# Patient Record
Sex: FEMALE | Race: BLACK OR AFRICAN AMERICAN | NOT HISPANIC OR LATINO | Employment: OTHER | URBAN - METROPOLITAN AREA
[De-identification: names, ages, dates, MRNs, and addresses within clinical notes are randomized per-mention and may not be internally consistent; named-entity substitution may affect disease eponyms.]

---

## 2017-04-24 ENCOUNTER — HOSPITAL ENCOUNTER (EMERGENCY)
Facility: OTHER | Age: 52
Discharge: HOME | End: 2017-04-24
Attending: EMERGENCY MEDICINE
Payer: MEDICAID

## 2017-04-24 VITALS
HEART RATE: 90 BPM | SYSTOLIC BLOOD PRESSURE: 147 MMHG | DIASTOLIC BLOOD PRESSURE: 88 MMHG | BODY MASS INDEX: 46.13 KG/M2 | RESPIRATION RATE: 16 BRPM | HEIGHT: 60 IN | TEMPERATURE: 98 F | WEIGHT: 235 LBS | OXYGEN SATURATION: 98 %

## 2017-04-24 DIAGNOSIS — S86.912A MUSCLE STRAIN OF LEFT LOWER LEG, INITIAL ENCOUNTER: ICD-10-CM

## 2017-04-24 DIAGNOSIS — S46.912A MUSCLE STRAIN, UPPER ARM, LEFT, INITIAL ENCOUNTER: Primary | ICD-10-CM

## 2017-04-24 LAB
B-HCG UR QL: NEGATIVE
CTP QC/QA: YES

## 2017-04-24 PROCEDURE — 81025 URINE PREGNANCY TEST: CPT | Performed by: EMERGENCY MEDICINE

## 2017-04-24 PROCEDURE — 96372 THER/PROPH/DIAG INJ SC/IM: CPT

## 2017-04-24 PROCEDURE — 99283 EMERGENCY DEPT VISIT LOW MDM: CPT | Mod: 25

## 2017-04-24 PROCEDURE — 63600175 PHARM REV CODE 636 W HCPCS: Performed by: EMERGENCY MEDICINE

## 2017-04-24 RX ORDER — IBUPROFEN 800 MG/1
800 TABLET ORAL EVERY 6 HOURS PRN
Qty: 10 TABLET | Refills: 0 | Status: SHIPPED | OUTPATIENT
Start: 2017-04-24 | End: 2017-08-01

## 2017-04-24 RX ORDER — KETOROLAC TROMETHAMINE 30 MG/ML
30 INJECTION, SOLUTION INTRAMUSCULAR; INTRAVENOUS
Status: COMPLETED | OUTPATIENT
Start: 2017-04-24 | End: 2017-04-24

## 2017-04-24 RX ORDER — TRAMADOL HYDROCHLORIDE 50 MG/1
50 TABLET ORAL EVERY 6 HOURS PRN
Qty: 5 TABLET | Refills: 0 | Status: SHIPPED | OUTPATIENT
Start: 2017-04-24 | End: 2017-05-04

## 2017-04-24 RX ADMIN — KETOROLAC TROMETHAMINE 30 MG: 30 INJECTION, SOLUTION INTRAMUSCULAR at 02:04

## 2017-04-24 NOTE — ED NOTES
Pt reports left forearm pain x 3 days, intermittent, dull. Pt reports left calf pain that started on the way to ER. Denies injury. Reports her doctor said she has arthritis. NAD. Ambulatory from triage. No edema or redness present. Rates pain 8/10. Will cont to monitor.

## 2017-04-24 NOTE — ED PROVIDER NOTES
"Encounter Date: 2017    SCRIBE #1 NOTE: I, Maritza Rey, am scribing for, and in the presence of, Dr. Carrillo.       History     Chief Complaint   Patient presents with    Arm Pain     Patient c/o left sided arm pain starting at elbow going down to hand and left calf pain.  Patient stated that she took Motrin with little relief.  Patient denies trauma.  Patient stated, "I think its my arthritis."     Review of patient's allergies indicates:  No Known Allergies  HPI Comments:   Time seen by provider: 1:44 PM    The patient is a 51 y.o. female with HTN, CHF, and arthritis who presents to the ED with an onset of intermittent mild LUE pain that radiated from the elbow down to the hand and left calf pain. The calf pain is non-radiating. The symptoms began 3 days ago. She is right handed. The patient has taken Motrin with little to no relief in symptoms. She denies trauma or injury, SOB, chest pain, neck pain, any other symptoms at this time. No pertinent SHx noted.     The history is provided by the patient.     Past Medical History:   Diagnosis Date    CHF (congestive heart failure)     Hypertension      Past Surgical History:   Procedure Laterality Date     SECTION       History reviewed. No pertinent family history.  Social History   Substance Use Topics    Smoking status: Never Smoker    Smokeless tobacco: None    Alcohol use No     Review of Systems   Constitutional: Negative for chills and fever.   HENT: Negative for nosebleeds.    Eyes: Negative for visual disturbance.   Respiratory: Negative for shortness of breath.    Cardiovascular: Negative for chest pain.   Gastrointestinal: Negative for abdominal pain, diarrhea, nausea and vomiting.   Genitourinary: Negative for dysuria and hematuria.   Musculoskeletal: Positive for myalgias (left LUE and left calf). Negative for neck pain.   Skin: Negative for rash.   Neurological: Negative for seizures, syncope and headaches.     Physical Exam "   Initial Vitals   BP Pulse Resp Temp SpO2   04/24/17 1305 04/24/17 1305 04/24/17 1305 04/24/17 1305 04/24/17 1305   143/87 97 14 97.8 °F (36.6 °C) 97 %     Physical Exam    Nursing note and vitals reviewed.  Constitutional: She appears well-developed and well-nourished.   HENT:   Head: Normocephalic and atraumatic.   Mouth/Throat: Oropharynx is clear and moist.   Eyes: EOM are normal. Pupils are equal, round, and reactive to light.   Neck: Normal range of motion. Neck supple.   Cardiovascular: Normal rate, regular rhythm, S1 normal, S2 normal and normal heart sounds. Exam reveals no gallop and no friction rub.    No murmur heard.  Pulses:       Radial pulses are 2+ on the left side.        Dorsalis pedis pulses are 2+ on the left side.        Posterior tibial pulses are 2+ on the left side.   Capillary refill is less than 2 seconds.    Pulmonary/Chest: Breath sounds normal. No respiratory distress. She has no wheezes. She has no rhonchi. She has no rales.   Abdominal: Soft. Bowel sounds are normal. There is no tenderness. There is no rebound and no guarding.   Musculoskeletal: Normal range of motion. She exhibits no edema.        Left forearm: She exhibits tenderness.        Left lower leg: She exhibits no edema and no deformity.   Tenderness over the left forearm. No evidence of cellulitis or abscess formation. No LLE crepitus, step-off, or palpable cord.   Lymphadenopathy:     She has no cervical adenopathy.   Neurological: She is alert and oriented to person, place, and time.   LUE  strength 5/5 and sensation intact to light touch. LLE strength 5/5.    Skin: Skin is warm and dry. No lesion and no rash noted. No pallor.   Psychiatric: She has a normal mood and affect. Her behavior is normal. Judgment and thought content normal.       ED Course   Procedures  Labs Reviewed   POCT URINE PREGNANCY - Normal           Medical Decision Making:   History:   Old Medical Records: I decided to obtain old medical  records.  Clinical Tests:   Lab Tests: Reviewed and Ordered            Scribe Attestation:   Scribe #1: I performed the above scribed service and the documentation accurately describes the services I performed. I attest to the accuracy of the note.    Attending Attestation:           Physician Attestation for Scribe:  Physician Attestation Statement for Scribe #1: I, Dr. Carrillo, reviewed documentation, as scribed by Maritza Rey in my presence, and it is both accurate and complete.         Attending ED Notes:   Urgent evaluation a 51-year-old female with nontraumatic left forearm pain and left lower leg pain.  Patient is afebrile, nontoxic-appearing with stable vital signs.  Patient is neurovascularly intact without focal neurologic deficits.  No crepitus.  No step-offs.  No deformity.  No clinical evidence of infection, cellulitis or abscess formation.  No septic joint.  No palpable cord.  Negative Homans sign.  The patient is extensively counseled on her diagnosis and treatment, discharged in good condition and directed follow-up with her PCP in the next 24-48 hours.          ED Course     Clinical Impression:     1. Muscle strain, upper arm, left, initial encounter    2. Muscle strain of left lower leg, initial encounter          Disposition:   Disposition: Discharged  Condition: Stable         Good Carrillo MD  04/24/17 2052

## 2017-04-24 NOTE — ED AVS SNAPSHOT
[facility]  [address]  [facility] [address]               Vanessa Ho   2017  1:31 PM   ED    Description:  Female : 1965   Department:  [facility]           Your Care was Coordinated By:     Provider Role From To    Good Carrillo MD Attending Provider 17 1333 --      Reason for Visit     Arm Pain           Diagnoses this Visit        Comments    Muscle strain, upper arm, left, initial encounter    -  Primary     Muscle strain of left lower leg, initial encounter           ED Disposition     None           To Do List           Follow-up Information     Follow up with Phil Genao Jr, MD In 2 days.    Specialty:  Family Medicine    Contact information:    [address]  [facility] [id number] [phone]         These Medications        Disp Refills Start End    ibuprofen (ADVIL,MOTRIN) 800 MG tablet 10 tablet 0 2017     Take 1 tablet (800 mg total) by mouth every 6 (six) hours as needed for Pain. - Oral    tramadol (ULTRAM) 50 mg tablet 5 tablet 0 2017    Take 1 tablet (50 mg total) by mouth every 6 (six) hours as needed for Pain. - Oral      [facility]     [facility] Nurse Care Line -  Assistance  Unless otherwise directed by your provider, please contact [facility], our nurse care line that is available for  assistance.     Registered nurses in the [facility] provide: appointment scheduling, clinical advisement, health education, and other advisory services.  Call: [phone] (toll free)               Medications           Message regarding Medications     Verify the changes and/or additions to your medication regime listed below are the same as discussed with your clinician today.  If any of these changes or additions are incorrect, please notify your healthcare provider.        START taking these NEW medications        Refills    ibuprofen  (ADVIL,MOTRIN) 800 MG tablet 0    Sig: Take 1 tablet (800 mg total) by mouth every 6 (six) hours as needed for Pain.    Class: Print    Route: Oral    tramadol (ULTRAM) 50 mg tablet 0    Sig: Take 1 tablet (50 mg total) by mouth every 6 (six) hours as needed for Pain.    Class: Print    Route: Oral      These medications were administered today        Dose Freq    ketorolac injection 30 mg 30 mg ED 1 Time    Sig: Inject 30 mg into the muscle ED 1 Time.    Class: Normal    Route: Intramuscular           Verify that the below list of medications is an accurate representation of the medications you are currently taking.  If none reported, the list may be blank. If incorrect, please contact your healthcare provider. Carry this list with you in case of emergency.           Current Medications     losartan-hydrochlorothiazide 50-12.5 mg (HYZAAR) 50-12.5 mg per tablet Take 1 tablet by mouth once daily.    ibuprofen (ADVIL,MOTRIN) 800 MG tablet Take 1 tablet (800 mg total) by mouth every 6 (six) hours as needed for Pain.    ketorolac injection 30 mg Inject 30 mg into the muscle ED 1 Time.    tramadol (ULTRAM) 50 mg tablet Take 1 tablet (50 mg total) by mouth every 6 (six) hours as needed for Pain.           Clinical Reference Information           Your Vitals Were     BP Pulse Temp Resp Height Weight    143/87 (BP Location: Left arm, Patient Position: Sitting) 97 97.8 °F (36.6 °C) (Oral) 14 5' (1.524 m) 106.6 kg (235 lb)    Last Period SpO2 BMI          04/04/2017 97% 45.9 kg/m2        Allergies as of 4/24/2017     No Known Allergies      Immunizations Administered on Date of Encounter - 4/24/2017     None      ED Micro, Lab, POCT     Start Ordered       Status Ordering Provider    04/24/17 1308 04/24/17 1307  POCT urine pregnancy  Once      Final result       ED Imaging Orders     None      Discharge References/Attachments     MUSCLE STRAIN, EXTREMITY (ENGLISH)      [facility] Sign-Up     Activating your [facility] account  is as easy as 1-2-3!     1) Visit [email or web address], select Sign Up Now, enter this activation code and your date of birth, then select Next.  [email or web address]  Expires: 6/8/2017  2:04 PM      2) Create a username and password to use when you visit [facility] in the future and select a security question in case you lose your password and select Next.    3) Enter your e-mail address and click Sign Up!    Additional Information  If you have questions, please e-mail [email or web address] or call [phone] to talk to our [employer] staff. Remember, [facility] is NOT to be used for urgent needs. For medical emergencies, dial 911.          [facility] complies with applicable Federal civil rights laws and does not discriminate on the basis of race, color, national origin, age, disability, or sex.        Language Assistance Services     ATTENTION: Language assistance services are available, free of charge. Please call [phone].      ATENCIÓN: Si habla español, tiene a xavier disposición servicios gratuitos de asistencia lingüística. Llame al [phone].     CHÚ Ý: N?u b?n nói Ti?ng Vi?t, có các d?ch v? h? tr? ngôn ng? mi?n phí dành cho b?n. G?i s? [phone].

## 2017-08-01 ENCOUNTER — HOSPITAL ENCOUNTER (EMERGENCY)
Facility: OTHER | Age: 52
Discharge: HOME | End: 2017-08-01
Attending: EMERGENCY MEDICINE
Payer: MEDICAID

## 2017-08-01 VITALS
OXYGEN SATURATION: 100 % | HEIGHT: 60 IN | BODY MASS INDEX: 45.55 KG/M2 | HEART RATE: 79 BPM | TEMPERATURE: 99 F | DIASTOLIC BLOOD PRESSURE: 80 MMHG | RESPIRATION RATE: 18 BRPM | WEIGHT: 232 LBS | SYSTOLIC BLOOD PRESSURE: 168 MMHG

## 2017-08-01 DIAGNOSIS — G56.02 CARPAL TUNNEL SYNDROME OF LEFT WRIST: Primary | ICD-10-CM

## 2017-08-01 LAB
B-HCG UR QL: NEGATIVE
CTP QC/QA: YES

## 2017-08-01 PROCEDURE — 99283 EMERGENCY DEPT VISIT LOW MDM: CPT | Mod: 25

## 2017-08-01 PROCEDURE — 63600175 PHARM REV CODE 636 W HCPCS: Performed by: EMERGENCY MEDICINE

## 2017-08-01 PROCEDURE — 81025 URINE PREGNANCY TEST: CPT | Performed by: EMERGENCY MEDICINE

## 2017-08-01 PROCEDURE — 29125 APPL SHORT ARM SPLINT STATIC: CPT | Mod: LT

## 2017-08-01 RX ORDER — IBUPROFEN 800 MG/1
800 TABLET ORAL EVERY 6 HOURS PRN
Qty: 12 TABLET | Refills: 0 | Status: SHIPPED | OUTPATIENT
Start: 2017-08-01 | End: 2017-12-10 | Stop reason: ALTCHOICE

## 2017-08-01 RX ORDER — DEXAMETHASONE SODIUM PHOSPHATE 4 MG/ML
8 INJECTION, SOLUTION INTRA-ARTICULAR; INTRALESIONAL; INTRAMUSCULAR; INTRAVENOUS; SOFT TISSUE
Status: COMPLETED | OUTPATIENT
Start: 2017-08-01 | End: 2017-08-01

## 2017-08-01 RX ORDER — KETOROLAC TROMETHAMINE 30 MG/ML
30 INJECTION, SOLUTION INTRAMUSCULAR; INTRAVENOUS
Status: COMPLETED | OUTPATIENT
Start: 2017-08-01 | End: 2017-08-01

## 2017-08-01 RX ADMIN — KETOROLAC TROMETHAMINE 30 MG: 30 INJECTION, SOLUTION INTRAMUSCULAR at 03:08

## 2017-08-01 RX ADMIN — DEXAMETHASONE SODIUM PHOSPHATE 8 MG: 4 INJECTION, SOLUTION INTRAMUSCULAR; INTRAVENOUS at 03:08

## 2017-08-01 NOTE — ED PROVIDER NOTES
"Encounter Date: 2017    SCRIBE #1 NOTE: I, Ninfa Maikol, am scribing for, and in the presence of, Dr. Jenkins.       History     Chief Complaint   Patient presents with    Wrist Pain     "I got that carpul tunnels"     Time seen by provider: 2:46 PM    This is a 51 y.o. female, with history of CHF and HTN, who presents with complaint of pain in her left hand that began two days ago. The pain is described as intermittent, aching, and rates 8/10 at its worst. The patient reports "tingling" in her left hand, but denies any fever, chills, nausea, vomiting, weakness, back pain, or headache. Pt denies any recent trauma, and reports that the pain worsens with exposure to the cold. She experienced some relief after applying IcyHot. Her LMP occurred last month. She denies use of tobacco, alcohol, or illicit drugs.      The history is provided by the patient.     Review of patient's allergies indicates:  No Known Allergies  Past Medical History:   Diagnosis Date    CHF (congestive heart failure)     Hypertension      Past Surgical History:   Procedure Laterality Date     SECTION       History reviewed. No pertinent family history.  Social History   Substance Use Topics    Smoking status: Never Smoker    Smokeless tobacco: Never Used    Alcohol use No     Review of Systems   Constitutional: Negative for chills and fever.   HENT: Negative for congestion and sore throat.    Eyes: Negative for photophobia and redness.   Respiratory: Negative for cough and shortness of breath.    Cardiovascular: Negative for chest pain.   Gastrointestinal: Negative for abdominal pain, nausea and vomiting.   Genitourinary: Negative for dysuria.   Musculoskeletal: Negative for back pain.        Positive for pain to the left hand.   Skin: Negative for rash.   Neurological: Negative for weakness, light-headedness and headaches.        Positive for "tingling" sensation that affects left hand.   Psychiatric/Behavioral: Negative for " confusion.       Physical Exam     Initial Vitals [08/01/17 1301]   BP Pulse Resp Temp SpO2   (!) 178/79 84 16 98.4 °F (36.9 °C) 99 %      MAP       112         Physical Exam    Nursing note and vitals reviewed.  Constitutional: She appears well-developed and well-nourished. She is not diaphoretic. No distress.   HENT:   Head: Normocephalic and atraumatic.   Mouth/Throat: Oropharynx is clear and moist.   Eyes: Conjunctivae and EOM are normal. Pupils are equal, round, and reactive to light. No scleral icterus.   Neck: Normal range of motion. Neck supple.   Cardiovascular: Normal rate, regular rhythm, S1 normal, S2 normal and normal heart sounds. Exam reveals no gallop and no friction rub.    No murmur heard.  Pulses:       Radial pulses are 2+ on the left side.   Pulmonary/Chest: Breath sounds normal. No respiratory distress. She has no wheezes. She has no rhonchi. She has no rales.   Abdominal: Soft. Bowel sounds are normal. There is no tenderness. There is no rebound and no guarding.   Musculoskeletal: Normal range of motion. She exhibits no edema or tenderness.   No tenderness over the scaphoid. Negative Phalen's and Tinel's sign. No crepitus, step-offs, or deformities.   Lymphadenopathy:     She has no cervical adenopathy.   Neurological: She is alert and oriented to person, place, and time.   LUE: Strength is 5/5. Sensation is intact.    Skin: Skin is warm and dry. Capillary refill takes less than 2 seconds. No rash noted. No pallor.   No clinical evidence of infection, cellulitis, or abscess formation. No clinical evidence of septic joint. Capillary refill <2 seconds.     Psychiatric: She has a normal mood and affect. Her behavior is normal. Judgment and thought content normal.         ED Course   Orthopedic Injury  Date/Time: 8/1/2017 3:19 PM  Authorized by: JF MCMILLAN   Performed by: JF MCMILLAN  Consent Done: Not Needed  Injury location: hand  Location details: left hand  Pre-procedure  neurological function: normal  Pre-procedure range of motion: normal    Sedation:  Patient sedated: no  Immobilization: splint  Splint type: velcro.  Post-procedure neurological function: normal  Post-procedure range of motion: normal  Patient tolerance: Patient tolerated the procedure well with no immediate complications        Labs Reviewed   POCT URINE PREGNANCY             Medical Decision Making:   Clinical Tests:   Lab Tests: Ordered and Reviewed            Scribe Attestation:   Scribe #1: I performed the above scribed service and the documentation accurately describes the services I performed. I attest to the accuracy of the note.    Attending Attestation:           Physician Attestation for Scribe:  Physician Attestation Statement for Scribe #1: I, Dr. Carrillo, reviewed documentation, as scribed by Ninfa Lassiter in my presence, and it is both accurate and complete.         Attending ED Notes:   Urgent evaluation a 51-year-old female with nontraumatic intermittent left hand pain.  Patient is afebrile, nontoxic-appearing with stable vital signs except for elevation in blood pressure.  Patient is neurovascularly intact without focal neurologic deficits.  No clinical evidence of infection, cellulitis or abscess formation.  No deformity, crepitus or step-offs.  Given patient's history present illness and clinical presentation including physical exam I do suspect patient is having symptoms of early carpal tunnel syndrome.  The patient is extensively counseled on her diagnosis and treatment, discharged in good condition and directed follow-up with orthopedics in the next 24-48 hours.          ED Course     Clinical Impression:     1. Carpal tunnel syndrome of left wrist                                 Good Carrillo MD  08/01/17 3716

## 2017-08-18 ENCOUNTER — HOSPITAL ENCOUNTER (EMERGENCY)
Facility: OTHER | Age: 52
Discharge: HOME | End: 2017-08-18
Attending: EMERGENCY MEDICINE
Payer: MEDICAID

## 2017-08-18 VITALS
BODY MASS INDEX: 46.13 KG/M2 | WEIGHT: 235 LBS | DIASTOLIC BLOOD PRESSURE: 71 MMHG | TEMPERATURE: 98 F | OXYGEN SATURATION: 99 % | HEIGHT: 60 IN | RESPIRATION RATE: 16 BRPM | HEART RATE: 80 BPM | SYSTOLIC BLOOD PRESSURE: 125 MMHG

## 2017-08-18 DIAGNOSIS — M79.642 PAIN OF LEFT HAND: Primary | ICD-10-CM

## 2017-08-18 PROCEDURE — 99282 EMERGENCY DEPT VISIT SF MDM: CPT

## 2017-08-18 NOTE — ED NOTES
Pt c/o intermittent left pain that worsens when it gets cold. She states the brace helped her hand but she left it on the bus. Denies trauma

## 2017-08-18 NOTE — ED PROVIDER NOTES
Encounter Date: 2017       History     Chief Complaint   Patient presents with    Hand Pain     L hand pain. reports had  a brace and left it on the bus. reports brace was helping the pain.      Patient is a 51 y.o. female with a past medical history of hypertension, CHF, presenting to the emergency department with complaints of persistent left hand pain.  The patient reports her symptoms initially started a few weeks ago.  She admits she was seen in this emergency department over 2 weeks ago where she was diagnosed with carpal tunnel syndrome.  She states she was given a wrist splint.  She states that the splint has significantly improved her symptoms, especially when used in combination with Motrin.  She reports she left her splint on the bus yesterday and her symptoms worsened overnight.  She reports tingling and pain to her left hand most significant at the first 3 digits, however does include all of them.  She denies recent injury or trauma.  She states that her PCP put in a referral for her to be seen at [facility] and she is still waiting.  She reports she last took motrin this am. She has no other complaints at this time.         The history is provided by the patient.     Review of patient's allergies indicates:  No Known Allergies  Past Medical History:   Diagnosis Date    CHF (congestive heart failure)     Hypertension      Past Surgical History:   Procedure Laterality Date     SECTION       History reviewed. No pertinent family history.  Social History   Substance Use Topics    Smoking status: Never Smoker    Smokeless tobacco: Never Used    Alcohol use No     Review of Systems   Constitutional: Negative for activity change, appetite change, chills, fatigue and fever.   HENT: Negative for congestion, rhinorrhea and sore throat.    Eyes: Negative for photophobia and visual disturbance.   Respiratory: Negative for cough, shortness of breath and wheezing.    Cardiovascular: Negative for chest  pain.   Gastrointestinal: Negative for abdominal pain, diarrhea, nausea and vomiting.   Genitourinary: Negative for dysuria, hematuria and urgency.   Musculoskeletal: Positive for arthralgias and myalgias. Negative for back pain and neck pain.   Skin: Negative for color change and wound.   Neurological: Negative for weakness and headaches.   Psychiatric/Behavioral: Negative for agitation and confusion.       Physical Exam     Initial Vitals [08/18/17 1008]   BP Pulse Resp Temp SpO2   123/77 106 18 98.1 °F (36.7 °C) 97 %      MAP       92.33         Physical Exam    Nursing note and vitals reviewed.  Constitutional: Vital signs are normal. She appears well-developed and well-nourished. She is not diaphoretic. She is Obese . She is cooperative.  Non-toxic appearance. She does not have a sickly appearance. She does not appear ill. No distress.   Well appearing, obese, -American female unaccompanied in the emergency department.  She is speaking in clear and full sentences.  She is in no acute distress.   HENT:   Head: Normocephalic and atraumatic.   Right Ear: External ear normal.   Left Ear: External ear normal.   Nose: Nose normal.   Mouth/Throat: Oropharynx is clear and moist.   Eyes: Conjunctivae and EOM are normal.   Neck: Normal range of motion. Neck supple.   Cardiovascular: Normal rate, regular rhythm and normal heart sounds.   Pulmonary/Chest: Breath sounds normal. No respiratory distress. She has no wheezes.   Musculoskeletal: Normal range of motion.   No significant tenderness to palpation of the left upper extremity near the wrist, hand or fingers.  Normal range of motion, strength, sensation diffusely.  Good capillary refill.  No overlying skin changes. Negative phalen or tinel test.    Neurological: She is alert and oriented to person, place, and time. She has normal strength. No sensory deficit. GCS eye subscore is 4. GCS verbal subscore is 5. GCS motor subscore is 6.   Skin: Skin is warm.    Psychiatric: She has a normal mood and affect. Her behavior is normal. Judgment and thought content normal.         ED Course   Procedures  Labs Reviewed - No data to display          Medical Decision Making:   Initial Assessment:   Urgent evaluation of a 51 y.o. female with a past medical history of HTN and CHF, presenting to the emergency department complaining of left hand pain x3 weeks. Patient is afebrile, nontoxic appearing, hemodynamically stable and neurovascularly intact.  Physical exam reveals regular rate and rhythm, lungs are clear to auscultation bilaterally.  Normal range of motion, strength and sensation the bilateral upper extremities.  No significant abnormalities of the left wrist, hand, fingers.  ED Management:  Given the patient's history and physical exam findings, I do not feel that further testing or imaging is warranted.  Patient has no history of trauma to warrant x-ray.  Signs and symptoms are likely secondary to musculoskeletal etiology, possibly carpal tunnel.  I will provide the patient with a new brace.  She was discharged home in stable condition, counseled on symptomatic care and treatment.  I did recommend that she continue to obtain follow-up with [facility].  Stable for discharge. The patient was instructed to follow up with a primary care provider in 2 days or to return to the emergency department for worsening symptoms. The treatment plan was discussed with the patient who demonstrated understanding and comfort with plan. The patient's history, physical exam, and plan of care was discussed with and agreed upon with my supervising physician.    Other:   I have discussed this case with another health care provider.       <> Summary of the Discussion: Dr. Jenkins  This note was created using Dragon Medical Dictation. There may be typographical errors secondary to dictation.                    ED Course     Clinical Impression:     1. Pain of left hand         Disposition:    Disposition: Discharged  Condition: Stable                        Kaylee Robertson PA-C  08/18/17 2714

## 2017-12-10 ENCOUNTER — HOSPITAL ENCOUNTER (EMERGENCY)
Facility: OTHER | Age: 52
Discharge: HOME | End: 2017-12-10
Attending: EMERGENCY MEDICINE
Payer: MEDICAID

## 2017-12-10 VITALS
RESPIRATION RATE: 18 BRPM | HEIGHT: 60 IN | BODY MASS INDEX: 48.49 KG/M2 | SYSTOLIC BLOOD PRESSURE: 131 MMHG | OXYGEN SATURATION: 99 % | TEMPERATURE: 99 F | DIASTOLIC BLOOD PRESSURE: 94 MMHG | WEIGHT: 247 LBS | HEART RATE: 76 BPM

## 2017-12-10 DIAGNOSIS — M79.642 PAIN OF LEFT HAND: Primary | ICD-10-CM

## 2017-12-10 PROCEDURE — 99283 EMERGENCY DEPT VISIT LOW MDM: CPT | Mod: 25

## 2017-12-10 PROCEDURE — 96372 THER/PROPH/DIAG INJ SC/IM: CPT

## 2017-12-10 PROCEDURE — 63600175 PHARM REV CODE 636 W HCPCS: Performed by: PHYSICIAN ASSISTANT

## 2017-12-10 PROCEDURE — 29125 APPL SHORT ARM SPLINT STATIC: CPT | Mod: LT

## 2017-12-10 RX ORDER — IBUPROFEN 600 MG/1
600 TABLET ORAL EVERY 6 HOURS PRN
Qty: 20 TABLET | Refills: 0 | Status: SHIPPED | OUTPATIENT
Start: 2017-12-10 | End: 2018-07-21

## 2017-12-10 RX ORDER — KETOROLAC TROMETHAMINE 30 MG/ML
15 INJECTION, SOLUTION INTRAMUSCULAR; INTRAVENOUS
Status: COMPLETED | OUTPATIENT
Start: 2017-12-10 | End: 2017-12-10

## 2017-12-10 RX ORDER — FUROSEMIDE 40 MG/1
40 TABLET ORAL DAILY PRN
COMMUNITY
End: 2018-02-09

## 2017-12-10 RX ADMIN — KETOROLAC TROMETHAMINE 15 MG: 30 INJECTION, SOLUTION INTRAMUSCULAR at 10:12

## 2017-12-10 NOTE — ED NOTES
"Pt c/o left thumb pain at the MCP joint X 2 days, not relieved with 800 mg of motrin. Pt c/o pain is exacerbated with movement. Pt states "one time my finger locked up."  "

## 2017-12-10 NOTE — ED PROVIDER NOTES
"Encounter Date: 12/10/2017       History     Chief Complaint   Patient presents with    Hand Pain     + left sided hand pain x two days " I have that carpet tunnel pains with this cold weather. I am also out of my pressure pills".      Pt is a 52-year-old female with history of hypertension and CHF who presents to the emergency department with left hand pain.  She states over the last several years she has had intermittent pain to the left wrist.  She states she has been diagnosed with carpal tunnel syndrome.  She states her symptoms act up during cold weather.  She denies any numbness or tingling.  She describes the pain as a throbbing sensation.  She denies new injury.  She denies swelling, redness, or warmth of the wrist.  She denies rashes or wounds.        The history is provided by the patient.     Review of patient's allergies indicates:  No Known Allergies  Past Medical History:   Diagnosis Date    CHF (congestive heart failure)     Hypertension      Past Surgical History:   Procedure Laterality Date     SECTION       No family history on file.  Social History   Substance Use Topics    Smoking status: Never Smoker    Smokeless tobacco: Never Used    Alcohol use No     Review of Systems   Constitutional: Negative for activity change, appetite change, chills and fever.   HENT: Negative for congestion, ear discharge, ear pain, nosebleeds, postnasal drip, rhinorrhea, sore throat and trouble swallowing.    Respiratory: Negative for cough and shortness of breath.    Cardiovascular: Negative for chest pain.   Gastrointestinal: Negative for abdominal pain, blood in stool, constipation, diarrhea, nausea and vomiting.   Genitourinary: Negative for dysuria, flank pain and hematuria.   Musculoskeletal: Negative for back pain, neck pain and neck stiffness.        Left wrist and thumb pain   Skin: Negative for rash and wound.   Neurological: Negative for dizziness, speech difficulty, weakness, " light-headedness, numbness and headaches.       Physical Exam     Initial Vitals [12/10/17 0959]   BP Pulse Resp Temp SpO2   (!) 140/87 99 16 97.8 °F (36.6 °C) 98 %      MAP       104.67         Physical Exam    Nursing note and vitals reviewed.  Constitutional: She appears well-developed and well-nourished. She is not diaphoretic.  Non-toxic appearance. No distress.   HENT:   Head: Normocephalic.   Right Ear: Hearing and external ear normal.   Left Ear: Hearing and external ear normal.   Nose: Nose normal.   Mouth/Throat: Uvula is midline, oropharynx is clear and moist and mucous membranes are normal. No trismus in the jaw. No uvula swelling. No oropharyngeal exudate.   Eyes: Conjunctivae are normal. Pupils are equal, round, and reactive to light.   Neck: Normal range of motion.   Cardiovascular: Normal rate and regular rhythm.   Pulmonary/Chest: Breath sounds normal. No respiratory distress. She has no wheezes. She has no rhonchi. She has no rales. She exhibits no tenderness.   Abdominal: Soft. Bowel sounds are normal. There is no tenderness.   Musculoskeletal:        Left wrist: She exhibits tenderness (with range of motion).   Negative tinnel and phalens; NV intact;  Pain at the radial styloid with active an passive stretch the thumb tendons over the radial styloid in thumb flexion   Lymphadenopathy:     She has no cervical adenopathy.   Neurological: She is alert and oriented to person, place, and time.   Skin: Skin is warm and dry. Capillary refill takes less than 2 seconds.   Psychiatric: She has a normal mood and affect.         ED Course   Procedures  Labs Reviewed - No data to display          Medical Decision Making:   Initial Assessment:   Urgent evaluation of a 52-year-old female with history of hypertension and CHF who presents to the emergency department with left wrist pain.  Patient is afebrile, nontoxic appearing, and hemodynamically stable.  No new injury.  On exam, there is no swelling, erythema,  or warmth of the left wrist.  No scaphoid tenderness.  Negative Tinel and Phalen's.  Positive Finkelstein maneuver.  I suspect tendinitis.  She'll be given anti-inflammatories.  She is requesting a cock-up splint.  She is advised to follow-up with or so.  She is advised to return to the emergency department with any worsening symptoms or concerns.    Other:   I have discussed this case with another health care provider.       <> Summary of the Discussion: Dr. Carrillo                   ED Course      Clinical Impression:   The encounter diagnosis was Pain of left hand.                           Ines Shaikh PA-C  12/10/17 1112

## 2017-12-10 NOTE — ED NOTES
A left wrist velcro splint was applied to the patient, she tolerated application well, CMS intact. Ines PISANO was notified and she said that she would assess the splint.

## 2017-12-10 NOTE — ED NOTES
Patient Identifiers for Vanessa Ho checked and correct  LOC: The patient is awake, alert and aware of environment with an appropriate affect, the patient is oriented x 3 and speaking appropriate.  APPEARANCE: Patient resting comfortably and in no acute distress. The patient is clean and well groomed. The patient's clothing is properly fastened.  SKIN: The skin is warm and dry. The patient has normal skin turgor and moist mucus membranes. No rashes or lesions upon observation. Skin Intact , no breakdown noted.  Musculoskeletal :  Normal range of motion noted. Moves all extremities well, No swelling. Palpation tenderness of the left thumb at the MCP joint.  RESPIRATORY: Airway is open and patent, respirations are spontaneous, patient has a normal effort and rate.   PULSES: 2+ radial  pulses, symmetrical .    Will continue to monitor

## 2017-12-23 ENCOUNTER — HOSPITAL ENCOUNTER (EMERGENCY)
Facility: OTHER | Age: 52
Discharge: HOME | End: 2017-12-23
Attending: EMERGENCY MEDICINE
Payer: MEDICAID

## 2017-12-23 VITALS
HEART RATE: 74 BPM | OXYGEN SATURATION: 100 % | BODY MASS INDEX: 47.12 KG/M2 | RESPIRATION RATE: 16 BRPM | TEMPERATURE: 98 F | SYSTOLIC BLOOD PRESSURE: 129 MMHG | HEIGHT: 60 IN | DIASTOLIC BLOOD PRESSURE: 79 MMHG | WEIGHT: 240 LBS

## 2017-12-23 DIAGNOSIS — B37.2 CANDIDAL INTERTRIGO: Primary | ICD-10-CM

## 2017-12-23 PROCEDURE — 99283 EMERGENCY DEPT VISIT LOW MDM: CPT

## 2017-12-23 RX ORDER — CLOTRIMAZOLE 1 %
CREAM (GRAM) TOPICAL
Qty: 15 G | Refills: 0 | Status: SHIPPED | OUTPATIENT
Start: 2017-12-23 | End: 2018-07-21

## 2017-12-23 NOTE — ED TRIAGE NOTES
"Pt reports to ED c/o rash below left breast with itching and pain x 2 days. Pt admits history of rash and PCP prescribed pt cream "for some fungus". Dark patches noted. Pt admits area intermittently moist.   "

## 2017-12-23 NOTE — ED PROVIDER NOTES
"Encounter Date: 2017    SCRIBE #1 NOTE: I, Rah Conrad, am scribing for, and in the presence of, Dr. Lobo.       History     Chief Complaint   Patient presents with    Rash     "I have a rash underneath my (left) breast because I've been sweating a lot." Denies fevers, chills, SOB, CP, N/V/D     9:15 AM    Patient is a 52 y.o. female with a history of HTN who presents to the ED with complaint of rash which she first noticed yesterday. Rash is located underneath the left breast, and is itchy, but not painful. She denies chest pain or shortness of breath. She reports getting new bras 2-3 weeks ago. She also states "I sweat a lot under my breasts." She reports a similar rash in the past that she states was fungal, which was relieved with "yellow and white cream" that was prescribed. She reports having recent PCP visit with blood work, which she states was normal. She has no additional complaints.      The history is provided by the patient.     Review of patient's allergies indicates:  No Known Allergies  Past Medical History:   Diagnosis Date    CHF (congestive heart failure)     Hypertension      Past Surgical History:   Procedure Laterality Date     SECTION       History reviewed. No pertinent family history.  Social History   Substance Use Topics    Smoking status: Never Smoker    Smokeless tobacco: Never Used    Alcohol use No     Review of Systems   Constitutional: Negative for fever.   HENT: Negative for sore throat.    Respiratory: Negative for shortness of breath.    Cardiovascular: Negative for chest pain.   Gastrointestinal: Negative for nausea.   Genitourinary: Negative for dysuria.   Musculoskeletal: Negative for back pain.   Skin: Positive for rash (left breast).   Neurological: Negative for weakness.   Hematological: Does not bruise/bleed easily.       Physical Exam     Initial Vitals [17 0750]   BP Pulse Resp Temp SpO2   (!) 159/96 85 16 98.1 °F (36.7 °C) 98 %    "   MAP       117         Physical Exam    Nursing note and vitals reviewed.  Constitutional: She appears well-developed and well-nourished. No distress.   HENT:   Head: Normocephalic and atraumatic.   Eyes: Conjunctivae and EOM are normal.   Neck: Neck supple.   Cardiovascular: Intact distal pulses.   Pulmonary/Chest: No respiratory distress.   Musculoskeletal: Normal range of motion. She exhibits no tenderness.   Neurological: She is alert and oriented to person, place, and time.   Skin: Skin is warm and dry. Rash noted.   Small area of hyperpigmentation underneath the left breast. No erythema or tenderness.   Psychiatric: She has a normal mood and affect. Her behavior is normal. Judgment and thought content normal.         ED Course   Procedures  Labs Reviewed - No data to display          Medical Decision Making:   ED Management:      52F history of HTN with rash under left breast for two days. Exam is consistent with candidal intertrigo. No sign of cellulitis or contact dermatitis, no systemic complaints or other concerns. She had recent PCP check up with no sign of diabetes, and does not want finger stick now to confirm. Likely from sweat and obesity. Will prescribe topical lotrimin with return precautions for any worsening.              Scribe Attestation:   Scribe #1: I performed the above scribed service and the documentation accurately describes the services I performed. I attest to the accuracy of the note.    Attending Attestation:           Physician Attestation for Scribe:  Physician Attestation Statement for Scribe #1: I, Rah Conrad, reviewed documentation, as scribed by Dr. Lobo in my presence, and it is both accurate and complete.                 ED Course      Clinical Impression:     1. Candidal intertrigo                               Dar Lobo MD  12/23/17 2639

## 2018-02-09 ENCOUNTER — HOSPITAL ENCOUNTER (EMERGENCY)
Facility: OTHER | Age: 53
Discharge: HOME | End: 2018-02-09
Attending: EMERGENCY MEDICINE
Payer: MEDICAID

## 2018-02-09 VITALS
DIASTOLIC BLOOD PRESSURE: 73 MMHG | HEART RATE: 93 BPM | WEIGHT: 250 LBS | OXYGEN SATURATION: 97 % | BODY MASS INDEX: 49.08 KG/M2 | RESPIRATION RATE: 18 BRPM | TEMPERATURE: 98 F | HEIGHT: 60 IN | SYSTOLIC BLOOD PRESSURE: 124 MMHG

## 2018-02-09 DIAGNOSIS — M25.9 KNEE PROBLEM: ICD-10-CM

## 2018-02-09 DIAGNOSIS — J06.9 VIRAL URI WITH COUGH: Primary | ICD-10-CM

## 2018-02-09 PROCEDURE — 25000003 PHARM REV CODE 250: Performed by: PHYSICIAN ASSISTANT

## 2018-02-09 PROCEDURE — 99283 EMERGENCY DEPT VISIT LOW MDM: CPT

## 2018-02-09 RX ORDER — CETIRIZINE HYDROCHLORIDE 10 MG/1
10 TABLET ORAL DAILY
Qty: 30 TABLET | Refills: 0 | Status: SHIPPED | OUTPATIENT
Start: 2018-02-09 | End: 2018-07-21

## 2018-02-09 RX ORDER — GUAIFENESIN/DEXTROMETHORPHAN 100-10MG/5
5 SYRUP ORAL ONCE
Status: COMPLETED | OUTPATIENT
Start: 2018-02-09 | End: 2018-02-09

## 2018-02-09 RX ORDER — GUAIFENESIN/DEXTROMETHORPHAN 100-10MG/5
5 SYRUP ORAL 4 TIMES DAILY PRN
Qty: 120 ML | Refills: 0 | Status: SHIPPED | OUTPATIENT
Start: 2018-02-09 | End: 2018-02-19

## 2018-02-09 RX ORDER — FLUTICASONE PROPIONATE 50 MCG
1 SPRAY, SUSPENSION (ML) NASAL 2 TIMES DAILY PRN
Qty: 15 G | Refills: 0 | Status: SHIPPED | OUTPATIENT
Start: 2018-02-09 | End: 2018-07-21

## 2018-02-09 RX ADMIN — GUAIFENESIN AND DEXTROMETHORPHAN 5 ML: 100; 10 SYRUP ORAL at 11:02

## 2018-02-09 NOTE — ED PROVIDER NOTES
"Encounter Date: 2018       History     Chief Complaint   Patient presents with    Cough     pt states "I've got a cough, it just started yesterday when I was in the cold air."  Also states "my right knee feels like it's going to give out."       Patient is 52-year-old female no past medical history presents with complaints of cough with nasal congestion that started yesterday.  She reports persistent despite taking doses of TheraFlu last night.  She reports no body aches, sore throat, fever, chills, nausea, vomiting, bowel changes.  She has not taken any medications today prior to arrival.  She also endorses that her right knee sometimes feels like it may give out.  She denies any pain, swelling, redness, overlying wounds.  She reports no difficulty ambulate.  No trauma or injury.  Has not taken any medications to help with knee pain though reports that knee pain also started yesterday.  Currently unaccompanied in the ER.          Review of patient's allergies indicates:  No Known Allergies  Past Medical History:   Diagnosis Date    Hypertension      Past Surgical History:   Procedure Laterality Date     SECTION       History reviewed. No pertinent family history.  Social History   Substance Use Topics    Smoking status: Never Smoker    Smokeless tobacco: Never Used    Alcohol use No     Review of Systems   Constitutional: Negative for fever.   HENT: Negative for sore throat.    Respiratory: Positive for cough. Negative for shortness of breath.    Cardiovascular: Negative for chest pain.   Gastrointestinal: Negative for nausea.   Genitourinary: Negative for dysuria.   Musculoskeletal: Negative for back pain.        Right knee pain   Skin: Negative for rash.   Neurological: Negative for weakness.   Hematological: Does not bruise/bleed easily.       Physical Exam     Initial Vitals [18 1031]   BP Pulse Resp Temp SpO2   (!) 150/93 98 18 98.6 °F (37 °C) 95 %      MAP       112         Physical " Exam    Nursing note and vitals reviewed.  Constitutional: She appears well-developed and well-nourished. She is not diaphoretic. No distress.   Healthy appearing female in no acute distress or apparent pain.  Makes good eye contact, speaks in clear full sentences and ambulates with ease.  Occasional cough during interview and exam.   HENT:   Head: Normocephalic and atraumatic.   Posterior oropharynx is slightly erythematous with some cobblestoning.  There is no trismus, uvula deviation or lingual elevation.  TMs clear bilaterally.  Nasal turbinates erythematous with clear mucus.   Eyes: Conjunctivae and EOM are normal. Pupils are equal, round, and reactive to light. Right eye exhibits no discharge. Left eye exhibits no discharge. No scleral icterus.   Neck: Normal range of motion.   No meningismus   Cardiovascular: Normal rate, regular rhythm, normal heart sounds and intact distal pulses. Exam reveals no gallop and no friction rub.    No murmur heard.  Pulmonary/Chest: Breath sounds normal. She has no wheezes. She has no rhonchi. She has no rales.   Lungs clear to auscultation bilaterally.    Abdominal: Soft. Bowel sounds are normal. There is no tenderness. There is no rebound and no guarding.   Musculoskeletal: Normal range of motion. She exhibits no edema or tenderness.   Right knee has no reproducible tenderness to palpation overlying erythema, edema, skin breakdown.  Distally she has normal range of motion of ankle with normal cap refill, sensation to light touch and range of motion.   Lymphadenopathy:     She has no cervical adenopathy.   Neurological: She is alert and oriented to person, place, and time. She has normal strength.   Skin: Skin is warm and dry. Capillary refill takes less than 2 seconds. No rash and no abscess noted. No erythema.   Psychiatric: She has a normal mood and affect. Her behavior is normal. Thought content normal.         ED Course   Procedures  Labs Reviewed - No data to display           Medical Decision Making:   ED Management:  Urgent evaluation a 52-year-old female who presents with complaints most consistent with viral URI with cough and atraumatic left knee complaints.  She is afebrile, nontoxic appearing, hemodynamically stable.  Physical exam outlined above and reveals HEENT inflammation with no evidence of acute bacterial infection.  Lungs are clear to auscultation with no hypoxia shortness of breath or chest pain.  No imaging felt warranted at this time.  Do not suspect flu in the setting of no fever or body aches.  Suspect symptoms will improve with supportive care.  She is given cough suppressant here and will discharge with Flonase, cough suppressant, ibuprofen.  With regards to knee she has completely normal exam with no evidence of acute osseous process or ligamentous injury or laxity.  Offered x-ray though - patient refused.  We'll give Ace wrap for support and compression, encourage anti-inflammatories if pain presents and to follow-up with orthopedics for symptom recheck as needed.  She is educated on follow-up plan as well as treatment plan and verbalized understanding.  She is amenable.  Stable for discharge.                   ED Course      Clinical Impression:   The primary encounter diagnosis was Viral URI with cough. A diagnosis of Knee problem was also pertinent to this visit.                           Nichole Ambrosio PA-C  02/09/18 5578

## 2018-02-09 NOTE — ED TRIAGE NOTES
"[place]."  States developed a non productive cough.  Also states her right kneed began feeling like it was going to "give out on me"  Sometimes.  States that just began yesterday also.  Denies N/V/D, fevers or chills.  Denies pain or burning with urination.  "

## 2018-02-09 NOTE — ED NOTES
"Patient Identifiers for Vanessa Ho checked and correct  LOC: The patient is awake, alert and aware of environment with an appropriate affect, the patient is oriented x 3 and speaking appropriate.  APPEARANCE: Patient resting comfortably and in no acute distress. The patient is clean and well groomed. The patient's clothing is properly fastened.  SKIN: The skin is warm and dry. The patient has normal skin turgor and moist mucus membranes. No rashes or lesions upon observation. Skin Intact , no breakdown noted.  Musculoskeletal :  Normal range of motion noted. Moves all extremities well, No swelling or tenderness noted.  Pt reports a feeling of knee "going out on me"  Sometimes  RESPIRATORY: Airway is open and patent, respirations are spontaneous, patient has a normal effort and rate. Pt reports non productive cough  ABDOMEN: Soft and non tender to palpation, no distention observed. Bowels Sounds are WNL all quads.  PULSES: 2+ radial pulses, symmetrical in all extremities.  NEUROLOGIC: PERRL,  Motor strength 5/5 all extremities.  The pt's facial expression is symmetrical, patient moving all extremities, normal sensation in all extremities when touched with a finger.The patient is awake, alert and cooperative with a calm affect, patient is aware of environment.      Will continue to monitor  "

## 2018-03-14 ENCOUNTER — HOSPITAL ENCOUNTER (EMERGENCY)
Facility: OTHER | Age: 53
Discharge: HOME | End: 2018-03-14
Attending: EMERGENCY MEDICINE
Payer: MEDICAID

## 2018-03-14 VITALS
BODY MASS INDEX: 46.13 KG/M2 | HEART RATE: 84 BPM | HEIGHT: 60 IN | DIASTOLIC BLOOD PRESSURE: 86 MMHG | RESPIRATION RATE: 14 BRPM | WEIGHT: 235 LBS | OXYGEN SATURATION: 97 % | SYSTOLIC BLOOD PRESSURE: 152 MMHG | TEMPERATURE: 99 F

## 2018-03-14 DIAGNOSIS — Z87.09 HISTORY OF SORE THROAT: ICD-10-CM

## 2018-03-14 DIAGNOSIS — M79.644 THUMB PAIN, RIGHT: Primary | ICD-10-CM

## 2018-03-14 PROCEDURE — 99283 EMERGENCY DEPT VISIT LOW MDM: CPT

## 2018-03-15 ENCOUNTER — PES CALL (OUTPATIENT)
Dept: ADMINISTRATIVE | Facility: CLINIC | Age: 53
End: 2018-03-15

## 2018-03-15 NOTE — ED PROVIDER NOTES
Encounter Date: 3/14/2018    SCRIBE #1 NOTE: I, Rah Conrad, am scribing for, and in the presence of, Dr. Arredondo.       History     Chief Complaint   Patient presents with    thumb pain     right thumb pain x 3 days    Sore Throat     x 1 day     7:08 PM    Patient is a 52 y.o. female who presents to the ED with complaint of right thumb pain for the last three days. Pain is worse with movement and to the touch. She notes a small lesion to the right thumb as well as mild swelling to the thumb, but denies any redness or wound to the thumb. She denies any unusual activity, trauma, or injury prior to onset of pain. She is right-handed. She reports minimal relief with ibuprofen 800 mg, which she has been taking TID. She denies any current sore throat, fever, chills, cough, congestion, nausea, vomiting, diarrhea, or pain at any other joints. She reports a history of HTN and states she is compliant with her blood pressure medication. She reports no known drug allergies. She had two past C-sections, but reports no additional past surgeries. She denies use of tobacco, alcohol, or illicit drugs.     Although nursing notes made note of a sore throat, patient denies any current sore throat and states this resolved after she ate earlier.      The history is provided by the patient.     Review of patient's allergies indicates:  No Known Allergies  Past Medical History:   Diagnosis Date    Hypertension      Past Surgical History:   Procedure Laterality Date     SECTION       History reviewed. No pertinent family history.  Social History   Substance Use Topics    Smoking status: Never Smoker    Smokeless tobacco: Never Used    Alcohol use No     Review of Systems   Constitutional: Negative for chills and fever.   HENT: Negative for congestion and sore throat.    Eyes: Negative for visual disturbance.   Respiratory: Negative for cough and shortness of breath.    Cardiovascular: Negative for chest pain and  palpitations.   Gastrointestinal: Negative for abdominal pain, diarrhea and vomiting.   Genitourinary: Negative for decreased urine volume, dysuria and vaginal discharge.   Musculoskeletal: Negative for joint swelling, neck pain and neck stiffness.        Positive for right thumb pain.   Skin: Negative for rash and wound.   Neurological: Negative for weakness, numbness and headaches.   Psychiatric/Behavioral: Negative for confusion.       Physical Exam     Initial Vitals [03/14/18 1657]   BP Pulse Resp Temp SpO2   (!) 152/86 84 14 98.5 °F (36.9 °C) 97 %      MAP       108         Physical Exam    Nursing note and vitals reviewed.  Constitutional: She appears well-developed and well-nourished. No distress.   HENT:   Head: Normocephalic and atraumatic.   Mouth/Throat: Oropharynx is clear and moist.   Eyes: Conjunctivae and EOM are normal. Pupils are equal, round, and reactive to light.   Neck: Normal range of motion. Neck supple.   Cardiovascular: Normal rate, regular rhythm and normal heart sounds.   No murmur heard.  Pulmonary/Chest: Breath sounds normal. No respiratory distress. She has no wheezes. She has no rhonchi. She has no rales.   Abdominal: Soft. Bowel sounds are normal. There is no tenderness.   Musculoskeletal: Normal range of motion.   RUE: mild swelling of the right thumb. Tenderness to palpation at the IP joint with a small callus.    Neurological: She is alert and oriented to person, place, and time. She has normal strength. No cranial nerve deficit or sensory deficit.   Skin: Skin is warm and dry. No rash noted.   Right thumb: No erythema or induration.   Psychiatric: She has a normal mood and affect. Her behavior is normal.         ED Course   Procedures  Labs Reviewed - No data to display          Medical Decision Making:   ED Management:  Emergent evaluation a 52-year-old female with complaint of right thumb pain, no known injury.  On exam there is mild swelling and tenderness which seems to be  localized around a small callus area.  There is no erythema or fluctuant lesion.  Patient was offered but declined an x-ray.  Ace wrap was applied and she is encouraged to return for new or worsening symptoms.            Scribe Attestation:   Scribe #1: I performed the above scribed service and the documentation accurately describes the services I performed. I attest to the accuracy of the note.    Attending Attestation:           Physician Attestation for Scribe:  Physician Attestation Statement for Scribe #1: I, Dr. Arredondo, reviewed documentation, as scribed by Rah Conrad in my presence, and it is both accurate and complete.                    Clinical Impression:     1. Thumb pain, right    2. History of sore throat                               Faiza Arredondo MD  03/14/18 2019

## 2018-03-15 NOTE — ED TRIAGE NOTES
"+right thumb pain x 3 days. Pt states " I have a little blister on my thumb. I can't tie my shoes, put my clothes on, do the dishes". No open wounds noted. Pt denies fever. Pt able to move thumb. No discoloration, loss of sensation.   "

## 2018-07-21 ENCOUNTER — HOSPITAL ENCOUNTER (EMERGENCY)
Facility: OTHER | Age: 53
Discharge: HOME | End: 2018-07-21
Attending: EMERGENCY MEDICINE
Payer: MEDICAID

## 2018-07-21 VITALS
HEIGHT: 60 IN | WEIGHT: 240 LBS | OXYGEN SATURATION: 98 % | BODY MASS INDEX: 47.12 KG/M2 | RESPIRATION RATE: 18 BRPM | TEMPERATURE: 98 F | HEART RATE: 74 BPM | SYSTOLIC BLOOD PRESSURE: 144 MMHG | DIASTOLIC BLOOD PRESSURE: 91 MMHG

## 2018-07-21 DIAGNOSIS — S96.911A STRAIN OF RIGHT FOOT, INITIAL ENCOUNTER: Primary | ICD-10-CM

## 2018-07-21 DIAGNOSIS — R52 PAIN: ICD-10-CM

## 2018-07-21 PROCEDURE — 99283 EMERGENCY DEPT VISIT LOW MDM: CPT | Mod: 25

## 2018-07-21 PROCEDURE — 96372 THER/PROPH/DIAG INJ SC/IM: CPT

## 2018-07-21 PROCEDURE — 63600175 PHARM REV CODE 636 W HCPCS: Performed by: EMERGENCY MEDICINE

## 2018-07-21 RX ORDER — IBUPROFEN 600 MG/1
600 TABLET ORAL EVERY 6 HOURS PRN
Qty: 9 TABLET | Refills: 0 | Status: SHIPPED | OUTPATIENT
Start: 2018-07-21 | End: 2019-02-12 | Stop reason: SDUPTHER

## 2018-07-21 RX ORDER — KETOROLAC TROMETHAMINE 30 MG/ML
30 INJECTION, SOLUTION INTRAMUSCULAR; INTRAVENOUS
Status: COMPLETED | OUTPATIENT
Start: 2018-07-21 | End: 2018-07-21

## 2018-07-21 RX ADMIN — KETOROLAC TROMETHAMINE 30 MG: 30 INJECTION, SOLUTION INTRAMUSCULAR at 09:07

## 2018-07-22 NOTE — ED PROVIDER NOTES
Encounter Date: 2018    SCRIBE #1 NOTE: I, Judith Hyman, am scribing for, and in the presence of, Dr. Jenkins.       History     Chief Complaint   Patient presents with    Foot Pain     Pt has had pain to rt foot X 2 days, denies trauma, sees a foot dr for other pain, but has not seen him for over a year     Time seen by provider: 8:52 PM    This is a 52 y.o. female, with history of HTN, who presents with complaint of right foot pain for three days. The 7/10 pain is intermittent. She denies trauma or injury. She has taken aleve with mild relief. She denies numbness or weakness. She denies use of alcohol, tobacco, or illicit drugs.      The history is provided by the patient.     Review of patient's allergies indicates:  No Known Allergies  Past Medical History:   Diagnosis Date    Hypertension      Past Surgical History:   Procedure Laterality Date     SECTION       History reviewed. No pertinent family history.  Social History   Substance Use Topics    Smoking status: Never Smoker    Smokeless tobacco: Never Used    Alcohol use No     Review of Systems   Constitutional: Negative for chills and fever.   HENT: Negative for congestion and sore throat.    Eyes: Negative for photophobia and redness.   Respiratory: Negative for cough and shortness of breath.    Cardiovascular: Negative for chest pain and leg swelling.   Gastrointestinal: Negative for abdominal pain, nausea and vomiting.   Genitourinary: Negative for dysuria.   Musculoskeletal: Negative for back pain.        Positive for foot pain.   Skin: Negative for rash.   Neurological: Negative for weakness, light-headedness, numbness and headaches.   Psychiatric/Behavioral: Negative for confusion.       Physical Exam     Initial Vitals [18]   BP Pulse Resp Temp SpO2   137/85 90 18 98.3 °F (36.8 °C) 97 %      MAP       --         Physical Exam    Nursing note and vitals reviewed.  Constitutional: She appears well-developed and  well-nourished. She is not diaphoretic. No distress.   HENT:   Head: Normocephalic and atraumatic.   Eyes: EOM are normal. No scleral icterus.   Neck: Normal range of motion. Neck supple.   Cardiovascular: Normal rate, regular rhythm and normal heart sounds. Exam reveals no gallop and no friction rub.    No murmur heard.  Pulmonary/Chest: Breath sounds normal. No respiratory distress. She has no wheezes. She has no rhonchi. She has no rales.   Musculoskeletal: Normal range of motion. She exhibits tenderness (base of right 5th metatarsal). She exhibits no edema.   RLE: No tenderness over the plantar fascia. No deformity or step offs.   Neurological: She is alert and oriented to person, place, and time.   Skin: Skin is warm and dry.   RLE: No clinical evidence of cellulitis or abscess formation. No palpable cord.   Psychiatric: She has a normal mood and affect. Her behavior is normal. Judgment and thought content normal.         ED Course   Procedures  Labs Reviewed - No data to display       Imaging Results          X-Ray Foot Complete Right (Final result)  Result time 07/21/18 21:42:14    Final result by Rakan Manzanares MD (07/21/18 21:42:14)                 Impression:      No acute displaced fracture-dislocation identified.      Electronically signed by: Rakan Manzanares MD  Date:    07/21/2018  Time:    21:42             Narrative:    EXAMINATION:  XR FOOT COMPLETE 3 VIEW RIGHT    CLINICAL HISTORY:  . Pain, unspecified    TECHNIQUE:  AP, lateral, and oblique views of the right foot were performed.    COMPARISON:  None    FINDINGS:  Bones are well mineralized.  Overall alignment is within normal limits.  Lisfranc articulation is congruent.  Small bone island within the anterior talus.  No displaced fracture, dislocation or destructive osseous process seen.  Minimal degenerative change of the 1st MTP joint.  Tiny plantar calcaneal spur.  No subcutaneous emphysema or radiodense retained foreign body.                               X-Rays:   Independently Interpreted Readings:   Other Readings:  Right Foot: No acute fracture or dislocation visualized.    Medical Decision Making:   Clinical Tests:   Radiological Study: Ordered and Reviewed            Scribe Attestation:   Scribe #1: I performed the above scribed service and the documentation accurately describes the services I performed. I attest to the accuracy of the note.    Attending Attestation:           Physician Attestation for Scribe:  Physician Attestation Statement for Scribe #1: I, Dr. Carrillo, reviewed documentation, as scribed by Judith Hyman in my presence, and it is both accurate and complete.         Attending ED Notes:   Emergent evaluation 52-year-old female with nontraumatic right foot pain.  Patient is neurovascularly intact without any focal neurologic deficits.  No deformity, or crepitus or step-offs.  No clinical evidence of infection, cellulitis or abscess formation.  X-rays are negative for any acute fractures or dislocations.  The patient extensively counseled on the diagnosis and treatment including all diagnostic and physical exam findings.  The patient is discharged in good condition and directed to follow up with her PCP and Podiatry in the next 24-48 hours.             Clinical Impression:     1. Strain of right foot, initial encounter    2. Pain                                 Good Carrillo MD  07/21/18 5309

## 2018-07-22 NOTE — ED TRIAGE NOTES
Pt came to the ED tonight c.o. Foot pain x couple of days. Pt denies any trauma to the area and no obvious deformity is noted to foot. Pt is able to ambulate with even steady gait at this time. Pt is in no acute distress at this time. Pt denies abdominal pain, nvd, or fevers at this time. Pt will be  Monitored

## 2018-09-17 ENCOUNTER — HOSPITAL ENCOUNTER (EMERGENCY)
Facility: OTHER | Age: 53
Discharge: HOME | End: 2018-09-17
Attending: EMERGENCY MEDICINE
Payer: MEDICAID

## 2018-09-17 VITALS
OXYGEN SATURATION: 100 % | WEIGHT: 250 LBS | SYSTOLIC BLOOD PRESSURE: 122 MMHG | HEIGHT: 64 IN | TEMPERATURE: 99 F | DIASTOLIC BLOOD PRESSURE: 80 MMHG | RESPIRATION RATE: 17 BRPM | HEART RATE: 75 BPM | BODY MASS INDEX: 42.68 KG/M2

## 2018-09-17 DIAGNOSIS — M79.642 HAND PAIN, LEFT: Primary | ICD-10-CM

## 2018-09-17 DIAGNOSIS — L25.9 CONTACT DERMATITIS, UNSPECIFIED CONTACT DERMATITIS TYPE, UNSPECIFIED TRIGGER: ICD-10-CM

## 2018-09-17 PROCEDURE — 25000003 PHARM REV CODE 250: Performed by: PHYSICIAN ASSISTANT

## 2018-09-17 PROCEDURE — 99283 EMERGENCY DEPT VISIT LOW MDM: CPT

## 2018-09-17 RX ORDER — IBUPROFEN 400 MG/1
800 TABLET ORAL
Status: DISCONTINUED | OUTPATIENT
Start: 2018-09-17 | End: 2018-09-18 | Stop reason: HOSPADM

## 2018-09-17 RX ORDER — HYDROCORTISONE 25 MG/G
CREAM TOPICAL 2 TIMES DAILY
Qty: 3.5 G | Refills: 0 | Status: SHIPPED | OUTPATIENT
Start: 2018-09-17 | End: 2018-09-24

## 2018-09-18 NOTE — ED PROVIDER NOTES
"Encounter Date: 2018       History     Chief Complaint   Patient presents with    Hand Pain     Pt has "stuck" feeling intermittently to joints to first three digits on L hand for last few days.    Rash     Rash with itching under L breast x3 days.     Patient is a 52-year-old female with hypertension who presents to the ED with hand pain and a rash. Patient reports left hand pain and stiffness for the past 4-5 days.  She states the pain is worse after movement throughout the day.  She states this pain feels similar to when she had pain on the right hand a few years ago.  She states this time she was diagnosed with arthritis and carpal tunnel syndrome.  She reports intermittent tingling to her thumb and index finger.  She states she is on her computer often.  Denies taking any analgesics for this pain. Patient also reports a rash underneath her left breast.  She states she is trying a new Dove soap.  She states the rash occurred shortly after this.  She states is pruritic.  She denies applying any lotion ointment to the area.      The history is provided by the patient.     Review of patient's allergies indicates:  No Known Allergies  Past Medical History:   Diagnosis Date    Hypertension      Past Surgical History:   Procedure Laterality Date     SECTION       No family history on file.  Social History     Tobacco Use    Smoking status: Never Smoker    Smokeless tobacco: Never Used   Substance Use Topics    Alcohol use: No    Drug use: No     Review of Systems   Constitutional: Negative for chills and fever.   HENT: Negative for congestion and sore throat.    Eyes: Negative for pain.   Respiratory: Negative for shortness of breath.    Cardiovascular: Negative for chest pain.   Gastrointestinal: Negative for abdominal pain, diarrhea, nausea and vomiting.   Genitourinary: Negative for dysuria.   Musculoskeletal: Positive for arthralgias.        Left hand pain   Skin: Positive for rash. "   Neurological: Negative for headaches.       Physical Exam     Initial Vitals [09/17/18 2031]   BP Pulse Resp Temp SpO2   134/83 99 16 98.5 °F (36.9 °C) 98 %      MAP       --         Physical Exam    Constitutional: Vital signs are normal. She is cooperative.   HENT:   Head: Normocephalic and atraumatic.   Eyes: EOM are normal. Pupils are equal, round, and reactive to light.   Neck: Normal range of motion. Neck supple.   Cardiovascular: Normal rate, regular rhythm and intact distal pulses.   Pulmonary/Chest: Breath sounds normal. She has no wheezes. She has no rhonchi. She has no rales.   Abdominal: Soft. Bowel sounds are normal. There is no tenderness.   Musculoskeletal:   Left hand:  No edema, bony tenderness, or bony abnormalities.  Negative Phalen and Tinel test.  Normal range of motion to all digits.    Neurological: She is alert and oriented to person, place, and time. GCS eye subscore is 4. GCS verbal subscore is 5. GCS motor subscore is 6.   Skin: Skin is warm and dry. Capillary refill takes less than 2 seconds. No rash noted.   Excoriated, skin colored rash noted under the left breast.  No vesicles or ulcerations.   Psychiatric: She has a normal mood and affect. Her behavior is normal.         ED Course   Procedures  Labs Reviewed - No data to display       Imaging Results    None          Medical Decision Making:   Initial Assessment:   Urgent evaluation of a 52 y.o. female presenting to the emergency department complaining of atraumatic hand pain and a pruritic rash. Patient is afebrile, nontoxic appearing and hemodynamically stable.  The patient has no reproducible pain on exam.  No signs to suggest carpal tunnel syndrome.  Patient is denying x-rays for further workup.  She states she just wants a brace.  Patient's rash does appear to be contact dermatitis.  Rash does not appear fungal or appear to be shingles.  ED Management:  Patient will be given Velcro thumb spica.  Patient was given Motrin here  for her pain in the ED.  She will also be sent home with hydrocortisone cream for her rash.  She is advised that if this worsens, rash may be fungal in nature.  She states she has an appointment tomorrow with her primary care provider.  Patient is given strict return precautions.  She has no other complaints this time is stable for discharge.  This note was created using MModal Dictation. There may be typographical errors secondary to dictation.                       Clinical Impression:     1. Hand pain, left    2. Contact dermatitis, unspecified contact dermatitis type, unspecified trigger                               Noel Piper PA-C  09/17/18 5276

## 2018-09-18 NOTE — ED NOTES
Pt complains of pain to left hand X 3 days, denies trauma. Pt also has rash under left breast X 3 days.

## 2018-11-21 ENCOUNTER — HOSPITAL ENCOUNTER (EMERGENCY)
Facility: OTHER | Age: 53
Discharge: HOME | End: 2018-11-21
Attending: EMERGENCY MEDICINE
Payer: MEDICAID

## 2018-11-21 VITALS
WEIGHT: 260 LBS | SYSTOLIC BLOOD PRESSURE: 172 MMHG | BODY MASS INDEX: 49.09 KG/M2 | OXYGEN SATURATION: 97 % | HEART RATE: 105 BPM | DIASTOLIC BLOOD PRESSURE: 106 MMHG | HEIGHT: 61 IN | TEMPERATURE: 98 F | RESPIRATION RATE: 18 BRPM

## 2018-11-21 DIAGNOSIS — J06.9 UPPER RESPIRATORY TRACT INFECTION, UNSPECIFIED TYPE: Primary | ICD-10-CM

## 2018-11-21 LAB
FLUAV AG SPEC QL IA: NEGATIVE
FLUBV AG SPEC QL IA: NEGATIVE
SPECIMEN SOURCE: NORMAL

## 2018-11-21 PROCEDURE — 87400 INFLUENZA A/B EACH AG IA: CPT

## 2018-11-21 PROCEDURE — 99284 EMERGENCY DEPT VISIT MOD MDM: CPT

## 2018-11-21 RX ORDER — FLUTICASONE PROPIONATE 50 MCG
1 SPRAY, SUSPENSION (ML) NASAL 2 TIMES DAILY PRN
Qty: 15 G | Refills: 0 | Status: SHIPPED | OUTPATIENT
Start: 2018-11-21

## 2018-11-21 RX ORDER — GUAIFENESIN/DEXTROMETHORPHAN 100-10MG/5
5 SYRUP ORAL 4 TIMES DAILY PRN
Qty: 236 ML | Refills: 0 | Status: SHIPPED | OUTPATIENT
Start: 2018-11-21 | End: 2018-12-01

## 2018-11-21 NOTE — ED NOTES
LOC: The patient is awake, alert and aware of environment with an appropriate affect, the patient is oriented x 3 and speaking appropriately.  APPEARANCE: Patient resting comfortably and in no acute distress, patient is clean and well groomed, patient's clothing is properly fastened.  SKIN: The skin is warm and dry, patient has normal skin turgor and moist mucus membranes, skin intact, no breakdown or brusing noted.  MUSKULOSKELETAL: Patient moving all extremities well, no obvious swelling or deformities noted.  RESPIRATORY: Airway is open and patent, respirations are spontaneous, patient has a normal effort and rate. Breath sounds are clear and equal bilaterally.

## 2018-11-21 NOTE — ED PROVIDER NOTES
"Encounter Date: 2018    SCRIBE #1 NOTE: I, Rah Conrad, am scribing for, and in the presence of, Dr. Lobo.       History     Chief Complaint   Patient presents with    URI     cough/congestion since yesterday. denies fever.     Seen by provider: 10:58 AM    Patient is a 52 y.o. female who presents to the ED with complaint of cough and congestion, which began yesterday. Cough is non-productive. She reports associated sneezing. She denies fever, chills, sore throat, wheezing, shortness of breath, chest pain, or leg swelling. She reports using "vapor rub" without improvement. She reports being prescribed flonase spray and cough syup with relief in the past, and states she was planning to go to her PCP for these, but he was out of town so she came here. She reports compliance with her blood pressure medications, but has not yet taken these today. She has no additional complaints at this time.      The history is provided by the patient.     Review of patient's allergies indicates:  No Known Allergies  Past Medical History:   Diagnosis Date    Hypertension      Past Surgical History:   Procedure Laterality Date     SECTION       History reviewed. No pertinent family history.  Social History     Tobacco Use    Smoking status: Never Smoker    Smokeless tobacco: Never Used   Substance Use Topics    Alcohol use: No    Drug use: No     Review of Systems   Constitutional: Negative for appetite change, chills and fever.   HENT: Positive for congestion and sneezing.    Respiratory: Positive for cough. Negative for shortness of breath and wheezing.    Cardiovascular: Negative for chest pain and leg swelling.   Gastrointestinal: Negative for abdominal pain, diarrhea, nausea and vomiting.   Genitourinary: Negative for dysuria.   Musculoskeletal: Negative for back pain.   Skin: Negative for rash.   Neurological: Negative for weakness and headaches.   Psychiatric/Behavioral: Negative for confusion. "       Physical Exam     Initial Vitals [11/21/18 0942]   BP Pulse Resp Temp SpO2   (!) 172/106 105 18 98.2 °F (36.8 °C) 97 %      MAP       --         Physical Exam    Nursing note and vitals reviewed.  Constitutional: She appears well-developed and well-nourished. She is not diaphoretic. No distress.   HENT:   Head: Normocephalic and atraumatic.   Mouth/Throat: Oropharynx is clear and moist.   Eyes: Conjunctivae and EOM are normal.   Neck: Normal range of motion. Neck supple.   Cardiovascular: Normal rate, regular rhythm and normal heart sounds. Exam reveals no gallop and no friction rub.    No murmur heard.  Pulmonary/Chest: Breath sounds normal. No respiratory distress. She has no wheezes. She has no rhonchi. She has no rales.   Musculoskeletal: Normal range of motion. She exhibits no edema.   Neurological: She is alert and oriented to person, place, and time.   Skin: Skin is warm and dry.   Psychiatric: She has a normal mood and affect. Her behavior is normal. Thought content normal.         ED Course   Procedures  Labs Reviewed   INFLUENZA A AND B ANTIGEN          Imaging Results    None          Medical Decision Making:   Initial Assessment:       A 52-year-old female with history of hypertension presents with nasal congestion and dry cough since yesterday.  She is well-appearing and afebrile arrival with normal exam.  Flu swab done from triage and negative. No sign of pneumonia and no indication for chest x-ray.  Patient states she usually sees her PCP for Flonase and cough suppressant, but was unable today so came to ED.  Likely uncomplicated viral URI, no indication for ED workup or antibiotics.  Will Rx Flonase and Robitussin DM; patient will continue supportive care for URI return to the ED for any worsening symptoms, fevers or other concerns      Clinical Tests:   Lab Tests: Ordered and Reviewed            Scribe Attestation:   Scribe #1: I performed the above scribed service and the documentation  accurately describes the services I performed. I attest to the accuracy of the note.    Attending Attestation:           Physician Attestation for Scribe:  Physician Attestation Statement for Scribe #1: I, Dr. Lobo, reviewed documentation, as scribed by Rah Conrad in my presence, and it is both accurate and complete.                    Clinical Impression:     1. Upper respiratory tract infection, unspecified type                              Dar Lobo MD  11/21/18 6674

## 2019-02-12 ENCOUNTER — HOSPITAL ENCOUNTER (EMERGENCY)
Facility: OTHER | Age: 54
Discharge: HOME | End: 2019-02-12
Attending: EMERGENCY MEDICINE
Payer: MEDICAID

## 2019-02-12 VITALS
BODY MASS INDEX: 49.67 KG/M2 | HEART RATE: 96 BPM | DIASTOLIC BLOOD PRESSURE: 91 MMHG | RESPIRATION RATE: 18 BRPM | WEIGHT: 253 LBS | TEMPERATURE: 98 F | OXYGEN SATURATION: 98 % | HEIGHT: 60 IN | SYSTOLIC BLOOD PRESSURE: 152 MMHG

## 2019-02-12 DIAGNOSIS — M79.602 LEFT ARM PAIN: Primary | ICD-10-CM

## 2019-02-12 PROCEDURE — 99283 EMERGENCY DEPT VISIT LOW MDM: CPT

## 2019-02-12 RX ORDER — IBUPROFEN 800 MG/1
800 TABLET ORAL EVERY 8 HOURS PRN
Qty: 20 TABLET | Refills: 0 | Status: SHIPPED | OUTPATIENT
Start: 2019-02-12

## 2019-02-12 RX ORDER — IBUPROFEN 400 MG/1
800 TABLET ORAL
Status: DISCONTINUED | OUTPATIENT
Start: 2019-02-12 | End: 2019-02-12

## 2019-02-12 NOTE — ED TRIAGE NOTES
"Pt reports 4 days of intermittent left arm pain. Denies trauma, reports, "I must have slept on it wrong." Pt reports 8 out of 10 pain, pt is AAO x 3, answers questions appropriately  "

## 2019-07-29 ENCOUNTER — HOSPITAL ENCOUNTER (EMERGENCY)
Facility: OTHER | Age: 54
Discharge: HOME | End: 2019-07-29
Attending: EMERGENCY MEDICINE
Payer: MEDICAID

## 2019-07-29 VITALS
DIASTOLIC BLOOD PRESSURE: 96 MMHG | HEART RATE: 64 BPM | TEMPERATURE: 98 F | WEIGHT: 239 LBS | RESPIRATION RATE: 18 BRPM | BODY MASS INDEX: 46.92 KG/M2 | SYSTOLIC BLOOD PRESSURE: 168 MMHG | HEIGHT: 60 IN | OXYGEN SATURATION: 99 %

## 2019-07-29 DIAGNOSIS — H10.9 CONJUNCTIVITIS OF RIGHT EYE, UNSPECIFIED CONJUNCTIVITIS TYPE: Primary | ICD-10-CM

## 2019-07-29 PROCEDURE — 99283 EMERGENCY DEPT VISIT LOW MDM: CPT

## 2019-07-29 RX ORDER — ERYTHROMYCIN 5 MG/G
OINTMENT OPHTHALMIC
Qty: 1 TUBE | Refills: 0 | Status: SHIPPED | OUTPATIENT
Start: 2019-07-29

## 2019-07-29 NOTE — ED TRIAGE NOTES
"+right eye irritation. Pt reports " I woke up with my eye like this. I think I have pink eye" pt denies vision change, burning or itching.     "

## 2019-07-30 NOTE — ED PROVIDER NOTES
"Encounter Date: 2019       History     Chief Complaint   Patient presents with    Eye Problem     +right eye pt states " I woke up w pink eye" pt reports irritation but denies vision loss, burning, itching     Patient is a 53-year-old female who presents with complaints of right eye irritation and redness x2 days prior to arrival.  She reports that she woke up with eye irritation and reports that it is goopy and sometimes itchy.  She reports no vision changes or contact lens use.  Denies any trauma to her eye.  She denies associated nasal congestion, sore throat, coughing, fever, chills, facial swelling. She is currently unaccompanied in the ER.        Review of patient's allergies indicates:  No Known Allergies  Past Medical History:   Diagnosis Date    Hypertension      Past Surgical History:   Procedure Laterality Date     SECTION       No family history on file.  Social History     Tobacco Use    Smoking status: Never Smoker    Smokeless tobacco: Never Used   Substance Use Topics    Alcohol use: No    Drug use: No     Review of Systems   Constitutional: Negative for fever.   HENT: Negative for sore throat.    Eyes: Positive for discharge, redness and itching.   Respiratory: Negative for shortness of breath.    Cardiovascular: Negative for chest pain.   Gastrointestinal: Negative for nausea.   Genitourinary: Negative for dysuria.   Musculoskeletal: Negative for back pain.   Skin: Negative for rash.   Neurological: Negative for weakness.   Hematological: Does not bruise/bleed easily.       Physical Exam     Initial Vitals [19 1248]   BP Pulse Resp Temp SpO2   (!) 168/93 77 18 98.2 °F (36.8 °C) 99 %      MAP       --         Physical Exam    Nursing note and vitals reviewed.  Constitutional: She appears well-developed and well-nourished.   Healthy-appearing female in no acute distress or apparent pain. She makes good eye contact, speaks in clear full sentences and ambulates with ease. "   HENT:   Head: Normocephalic and atraumatic.   Eyes: EOM are normal. Pupils are equal, round, and reactive to light. Right eye exhibits discharge. Left eye exhibits no discharge. No scleral icterus.   Normal pupillary response to light  Normal extraocular eye muscle  Visual acuity is 20/20 OS, OD, OU   Neck: Normal range of motion.   Cardiovascular: Normal rate, regular rhythm, normal heart sounds and intact distal pulses. Exam reveals no gallop and no friction rub.    No murmur heard.  Pulmonary/Chest: Breath sounds normal.   Abdominal: Soft. Bowel sounds are normal. There is no tenderness. There is no rebound and no guarding.   Musculoskeletal: Normal range of motion. She exhibits no edema or tenderness.   Lymphadenopathy:     She has no cervical adenopathy.   Neurological: She is alert and oriented to person, place, and time. She has normal strength.   Skin: Skin is warm. Capillary refill takes less than 2 seconds. No rash and no abscess noted. No erythema.   Psychiatric: She has a normal mood and affect. Her behavior is normal. Thought content normal.         ED Course   Procedures  Labs Reviewed - No data to display       Imaging Results    None          Medical Decision Making:   ED Management:  Urgent evaluation a 53-year-old female who presents with complaints most consistent with right-sided conjunctivitis.  She is afebrile, nontoxic appearing, hemodynamically stable. Physical exam outlined above and reveals injected sclera with purulence collecting add medial canthus.  There is no visual acuity deficits and no sensation of foreign body.  Patient is treated with erythromycin and encouraged to follow up with Ophthalmology in the outpatient setting.  She is educated on ED return precautions verbalized understanding.  She is felt safe for discharge.                      Clinical Impression:       ICD-10-CM ICD-9-CM   1. Conjunctivitis of right eye, unspecified conjunctivitis type H10.9 372.30                                 Nichole Ambrosio PA-C  07/29/19 [id number]

## 2019-09-30 ENCOUNTER — HOSPITAL ENCOUNTER (EMERGENCY)
Facility: OTHER | Age: 54
Discharge: HOME | End: 2019-09-30
Attending: EMERGENCY MEDICINE
Payer: MEDICAID

## 2019-09-30 VITALS
OXYGEN SATURATION: 98 % | HEART RATE: 81 BPM | BODY MASS INDEX: 48.82 KG/M2 | DIASTOLIC BLOOD PRESSURE: 87 MMHG | WEIGHT: 250 LBS | RESPIRATION RATE: 16 BRPM | TEMPERATURE: 98 F | SYSTOLIC BLOOD PRESSURE: 152 MMHG

## 2019-09-30 DIAGNOSIS — I10 HYPERTENSION, UNCONTROLLED: Primary | ICD-10-CM

## 2019-09-30 DIAGNOSIS — Z87.898 HISTORY OF HEADACHE: ICD-10-CM

## 2019-09-30 PROCEDURE — 99282 EMERGENCY DEPT VISIT SF MDM: CPT
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
## 2019-09-30 NOTE — ED PROVIDER NOTES
"Encounter Date: 2019    SCRIBE #1 NOTE: I, Elisabetkatie Marcus, am scribing for, and in the presence of, Dr. Arredondo.       History     Chief Complaint   Patient presents with    Headache     x 3 days "i think my pressure is high". compliant with BP meds. Denies vision changes, extremities weakness or other deficits.      Time seen by provider: 10:21 AM  This is a 53 y.o. female who presents with complaint of headache that began this morning. Patient states headache began after she ate fish and grits this morning for breakfast. She was concerned symptom was related to her blood pressure. She reports after taking blood pressure medications, the headache resolved. She denies any complaints at this time. She denies fever, chills, nausea, vomiting, diarrhea, vision changes, chest pain, SOB, abdominal pain, dizziness, light headedness, or weakness. Patient reports regular follow up with her PCP every 3 months, and states her next appointment is in December. She denies history of diabetes, or heart disease.    The history is provided by the patient.     Review of patient's allergies indicates:  No Known Allergies  Past Medical History:   Diagnosis Date    Hypertension      Past Surgical History:   Procedure Laterality Date     SECTION       No family history on file.  Social History     Tobacco Use    Smoking status: Never Smoker    Smokeless tobacco: Never Used   Substance Use Topics    Alcohol use: No    Drug use: No     Review of Systems   Constitutional: Negative for chills and fever.   HENT: Negative for congestion, sore throat and trouble swallowing.    Eyes: Negative for photophobia and visual disturbance.   Respiratory: Negative for shortness of breath.    Cardiovascular: Negative for chest pain.   Gastrointestinal: Negative for abdominal pain, diarrhea, nausea and vomiting.   Genitourinary: Negative for dysuria and hematuria.   Musculoskeletal: Negative for back pain and neck pain.   Skin: Negative for " rash and wound.   Neurological: Negative for dizziness, weakness, light-headedness and headaches.   Psychiatric/Behavioral: Negative.  Negative for behavioral problems and confusion.       Physical Exam     Vitals:    09/30/19 0938 09/30/19 1002   BP: (!) 169/88 (!) 152/87   Pulse: 76 81   Resp: 16    Temp: 97.9 °F (36.6 °C)    TempSrc: Oral    SpO2: 99% 98%   Weight: 113.4 kg (250 lb)        Physical Exam    Nursing note and vitals reviewed.  Constitutional: She appears well-developed and well-nourished. No distress.   HENT:   Head: Normocephalic and atraumatic.   Mouth/Throat: Oropharynx is clear and moist.   Eyes: Conjunctivae and EOM are normal. Pupils are equal, round, and reactive to light.   Neck: Normal range of motion. Neck supple.   Cardiovascular: Normal rate, regular rhythm and normal heart sounds. Exam reveals no gallop and no friction rub.    No murmur heard.  Pulmonary/Chest: Breath sounds normal. No respiratory distress. She has no wheezes. She has no rhonchi. She has no rales.   Abdominal: Soft. Bowel sounds are normal. There is no tenderness. There is no rebound and no guarding.   Musculoskeletal: Normal range of motion. She exhibits no edema or tenderness.   Neurological: She is alert and oriented to person, place, and time. She has normal strength. No cranial nerve deficit or sensory deficit. GCS score is 15. GCS eye subscore is 4. GCS verbal subscore is 5. GCS motor subscore is 6.   Skin: Skin is warm and dry. No rash noted.   Psychiatric: She has a normal mood and affect. Her behavior is normal. Judgment and thought content normal.         ED Course   Procedures  Labs Reviewed - No data to display       Imaging Results    None          Medical Decision Making:   ED Management:  Emergent evaluation a 53-year-old female who presents with complaint of a headache which is now resolved, history of hypertension.  She admits that she does not take a log of her blood pressures or check it regularly.   She reports associating her headache this morning with elevated blood pressure, but the headache resolved after taking her blood pressure medicine.  She has no complaints at time of presentation to the ER.  Vital signs do reveal mild hypertension, afebrile.  There is no neurologic deficit to suggest a CVA.  Physical exam is benign.  She was offered a urinalysis to check for proteinuria, but declined.  She stated that she felt reassured and ready to go.  She is discharged in good condition and encouraged to keep a blood pressure log and to follow up with her PCP or return here for new or worsening symptoms.            Scribe Attestation:   Scribe #1: I performed the above scribed service and the documentation accurately describes the services I performed. I attest to the accuracy of the note.    Attending Attestation:           Physician Attestation for Scribe:  Physician Attestation Statement for Scribe #1: I, Dr. Arredondo, reviewed documentation, as scribed by Elisabet Marcus in my presence, and it is both accurate and complete.                    Clinical Impression:     1. Hypertension, uncontrolled    2. History of headache                                 Faiza Arredondo MD  09/30/19 [id number]

## 2021-10-08 ENCOUNTER — HOSPITAL ENCOUNTER (EMERGENCY)
Facility: CLINIC | Age: 56
Discharge: HOME | End: 2021-10-08
Attending: FAMILY MEDICINE | Admitting: FAMILY MEDICINE
Payer: COMMERCIAL

## 2021-10-08 ENCOUNTER — APPOINTMENT (OUTPATIENT)
Dept: CT IMAGING | Facility: CLINIC | Age: 56
End: 2021-10-08
Attending: FAMILY MEDICINE
Payer: COMMERCIAL

## 2021-10-08 VITALS
OXYGEN SATURATION: 100 % | SYSTOLIC BLOOD PRESSURE: 130 MMHG | RESPIRATION RATE: 18 BRPM | HEART RATE: 82 BPM | TEMPERATURE: 98.8 F | WEIGHT: 95 LBS | DIASTOLIC BLOOD PRESSURE: 70 MMHG

## 2021-10-08 DIAGNOSIS — I10 ESSENTIAL HYPERTENSION: ICD-10-CM

## 2021-10-08 DIAGNOSIS — R07.89 CHEST WALL PAIN: ICD-10-CM

## 2021-10-08 LAB
ANION GAP SERPL CALCULATED.3IONS-SCNC: 11 MMOL/L (ref 5–18)
ATRIAL RATE - MUSE: 104 BPM
BASOPHILS # BLD AUTO: 0.1 10E3/UL (ref 0–0.2)
BASOPHILS NFR BLD AUTO: 1 %
BUN SERPL-MCNC: 13 MG/DL (ref 8–22)
CALCIUM SERPL-MCNC: 10.1 MG/DL (ref 8.5–10.5)
CHLORIDE BLD-SCNC: 104 MMOL/L (ref 98–107)
CO2 SERPL-SCNC: 27 MMOL/L (ref 22–31)
CREAT SERPL-MCNC: 0.86 MG/DL (ref 0.6–1.1)
DIASTOLIC BLOOD PRESSURE - MUSE: 94 MMHG
EOSINOPHIL # BLD AUTO: 0.1 10E3/UL (ref 0–0.7)
EOSINOPHIL NFR BLD AUTO: 1 %
ERYTHROCYTE [DISTWIDTH] IN BLOOD BY AUTOMATED COUNT: 12.6 % (ref 10–15)
GFR SERPL CREATININE-BSD FRML MDRD: 77 ML/MIN/1.73M2
GLUCOSE BLD-MCNC: 99 MG/DL (ref 70–125)
HCT VFR BLD AUTO: 38 % (ref 35–47)
HGB BLD-MCNC: 12.6 G/DL (ref 11.7–15.7)
HOLD SPECIMEN: NORMAL
HOLD SPECIMEN: NORMAL
IMM GRANULOCYTES # BLD: 0 10E3/UL
IMM GRANULOCYTES NFR BLD: 0 %
INTERPRETATION ECG - MUSE: NORMAL
LYMPHOCYTES # BLD AUTO: 2.7 10E3/UL (ref 0.8–5.3)
LYMPHOCYTES NFR BLD AUTO: 37 %
MAGNESIUM SERPL-MCNC: 2.1 MG/DL (ref 1.8–2.6)
MCH RBC QN AUTO: 29.9 PG (ref 26.5–33)
MCHC RBC AUTO-ENTMCNC: 33.2 G/DL (ref 31.5–36.5)
MCV RBC AUTO: 90 FL (ref 78–100)
MONOCYTES # BLD AUTO: 0.8 10E3/UL (ref 0–1.3)
MONOCYTES NFR BLD AUTO: 11 %
NEUTROPHILS # BLD AUTO: 3.7 10E3/UL (ref 1.6–8.3)
NEUTROPHILS NFR BLD AUTO: 50 %
NRBC # BLD AUTO: 0 10E3/UL
NRBC BLD AUTO-RTO: 0 /100
P AXIS - MUSE: 85 DEGREES
PLATELET # BLD AUTO: 198 10E3/UL (ref 150–450)
POTASSIUM BLD-SCNC: 3.3 MMOL/L (ref 3.5–5)
PR INTERVAL - MUSE: 192 MS
QRS DURATION - MUSE: 72 MS
QT - MUSE: 362 MS
QTC - MUSE: 476 MS
R AXIS - MUSE: 56 DEGREES
RBC # BLD AUTO: 4.21 10E6/UL (ref 3.8–5.2)
SODIUM SERPL-SCNC: 142 MMOL/L (ref 136–145)
SYSTOLIC BLOOD PRESSURE - MUSE: 178 MMHG
T AXIS - MUSE: 49 DEGREES
TROPONIN I SERPL-MCNC: <0.01 NG/ML (ref 0–0.29)
TSH SERPL DL<=0.005 MIU/L-ACNC: 4.99 UIU/ML (ref 0.3–5)
VENTRICULAR RATE- MUSE: 104 BPM
WBC # BLD AUTO: 7.3 10E3/UL (ref 4–11)

## 2021-10-08 PROCEDURE — 84443 ASSAY THYROID STIM HORMONE: CPT | Performed by: FAMILY MEDICINE

## 2021-10-08 PROCEDURE — 85004 AUTOMATED DIFF WBC COUNT: CPT | Performed by: EMERGENCY MEDICINE

## 2021-10-08 PROCEDURE — 96361 HYDRATE IV INFUSION ADD-ON: CPT

## 2021-10-08 PROCEDURE — 80048 BASIC METABOLIC PNL TOTAL CA: CPT | Performed by: FAMILY MEDICINE

## 2021-10-08 PROCEDURE — 85025 COMPLETE CBC W/AUTO DIFF WBC: CPT | Performed by: FAMILY MEDICINE

## 2021-10-08 PROCEDURE — 83735 ASSAY OF MAGNESIUM: CPT | Performed by: FAMILY MEDICINE

## 2021-10-08 PROCEDURE — 71275 CT ANGIOGRAPHY CHEST: CPT

## 2021-10-08 PROCEDURE — 84484 ASSAY OF TROPONIN QUANT: CPT | Performed by: FAMILY MEDICINE

## 2021-10-08 PROCEDURE — 36415 COLL VENOUS BLD VENIPUNCTURE: CPT | Performed by: FAMILY MEDICINE

## 2021-10-08 PROCEDURE — 36415 COLL VENOUS BLD VENIPUNCTURE: CPT | Performed by: EMERGENCY MEDICINE

## 2021-10-08 PROCEDURE — 80048 BASIC METABOLIC PNL TOTAL CA: CPT | Performed by: EMERGENCY MEDICINE

## 2021-10-08 PROCEDURE — 258N000003 HC RX IP 258 OP 636: Performed by: FAMILY MEDICINE

## 2021-10-08 PROCEDURE — 93005 ELECTROCARDIOGRAM TRACING: CPT | Performed by: FAMILY MEDICINE

## 2021-10-08 PROCEDURE — 99285 EMERGENCY DEPT VISIT HI MDM: CPT | Mod: 25

## 2021-10-08 PROCEDURE — 96360 HYDRATION IV INFUSION INIT: CPT

## 2021-10-08 PROCEDURE — 250N000011 HC RX IP 250 OP 636: Performed by: FAMILY MEDICINE

## 2021-10-08 RX ORDER — KETOROLAC TROMETHAMINE 15 MG/ML
15 INJECTION, SOLUTION INTRAMUSCULAR; INTRAVENOUS ONCE
Status: DISCONTINUED | OUTPATIENT
Start: 2021-10-08 | End: 2021-10-08 | Stop reason: HOSPADM

## 2021-10-08 RX ORDER — IOPAMIDOL 755 MG/ML
100 INJECTION, SOLUTION INTRAVASCULAR ONCE
Status: COMPLETED | OUTPATIENT
Start: 2021-10-08 | End: 2021-10-08

## 2021-10-08 RX ADMIN — IOPAMIDOL 100 ML: 755 INJECTION, SOLUTION INTRAVENOUS at 12:28

## 2021-10-08 RX ADMIN — SODIUM CHLORIDE 1000 ML: 9 INJECTION, SOLUTION INTRAVENOUS at 10:55

## 2021-10-08 ASSESSMENT — ENCOUNTER SYMPTOMS
HEADACHES: 0
DIZZINESS: 0
SORE THROAT: 0
LIGHT-HEADEDNESS: 0
DIARRHEA: 0
BLOOD IN STOOL: 0
SHORTNESS OF BREATH: 1
NUMBNESS: 0
CHILLS: 0
COUGH: 0
VOMITING: 0
DYSURIA: 0
FEVER: 0
ABDOMINAL PAIN: 0
HEMATURIA: 0
ARTHRALGIAS: 0
NAUSEA: 0

## 2021-10-08 NOTE — ED TRIAGE NOTES
Last night began to have chest pain and shortness of breath.  Denies nausea and dizziness.  Is vaccinated

## 2021-10-08 NOTE — ED NOTES
Introduced self to patient.  Whiteboard updated.  Plan of care and length of time discussed with patient.  Will continue to monitor. Eloisa Schulz RN.......10/8/2021 10:34 AM

## 2021-10-08 NOTE — ED PROVIDER NOTES
EMERGENCY DEPARTMENT ENCOUNTER      NAME: Lakshmi Quarles  AGE: 55 year old female  YOB: 1965  MRN: [id number]  EVALUATION DATE & TIME: 10/8/2021  9:17 AM    PCP: Latricia Qiu    ED PROVIDER: Jaydon Ramirez M.D.    Chief Complaint   Patient presents with     Chest Pain     Shortness of Breath       FINAL IMPRESSION:  1. Chest wall pain    2. Essential hypertension        ED COURSE & MEDICAL DECISION MAKING:    Pertinent Labs & Imaging studies personally reviewed and interpreted by me. (See chart for details)  10:04 AM  Patient seen and examined, prior records reviewed.  Differential diagnosis includes but not limited to chest wall pain, esophagitis, myocardial infarction, pneumonia, rib fracture, pulmonary embolism, pneumothorax, aortic dissection, aortic aneurysm.  Patient presents with left upper outer chest pain that started last night.  She does have reproducible tenderness on exam, however she also has what appears to be a sinus tachycardia on the monitor.  Pain is not necessarily pleuritic, however PERC positive.  Walls is pending and EKG is not yet been performed.  CT PE protocol was ordered as well.  11:59 AM labs so far reassuring including negative troponin, TSH is normal.  CT PE protocol is pending.  1:15 PM CT scan is negative for acute pulmonary embolism, patient stable for discharge with outpatient follow-up.  Likely chest wall pain, troponin and EKG are reassuring pain is reproducible on palpation.       At the conclusion of the encounter I discussed the results of all of the tests and the disposition. The questions were answered. The patient or family acknowledged understanding and was agreeable with the care plan.     PROCEDURES:   Procedures   The cardiac monitor revealed sinus rhythm as interpreted by me. The cardiac monitor was ordered secondary to the patient s history of chest pain and to monitor the patient for dysrhythmia    MEDICATIONS GIVEN IN THE  EMERGENCY:  Medications   ketorolac (TORADOL) injection 15 mg (15 mg Intravenous Not Given 10/8/21 1311)   0.9% sodium chloride BOLUS (0 mLs Intravenous Stopped 10/8/21 1245)   iopamidol (ISOVUE-370) solution 100 mL (100 mLs Intravenous Given 10/8/21 1228)       NEW PRESCRIPTIONS STARTED AT TODAY'S ER VISIT  There are no discharge medications for this patient.      =================================================================    HPI    Patient information was obtained from: patient      Lakshmi Quarles is a 55 year old female with a pertinent history of depression who presents to this ED via walk in for evaluation of chest pain and shortness of breath.     Last night, patient began to have chest pain and shortness of breath. She denies any associated nausea and dizziness.  This started while she was sleeping and woke her up.  Pain is constant, waxes and wanes but no specific relieving or exacerbating factors.  Aching in the left upper lateral chest.  Not worse with breathing or movement.  No fever or chills, she does get hot flashes related to menopause but that has been longstanding.  No vomiting or diarrhea.  No fall or injury.    Per EMR, patient had her second Moderna COVID-19 vaccine dose on 03/26/2021.    REVIEW OF SYSTEMS   Review of Systems   Constitutional: Negative for chills and fever.   HENT: Negative for congestion and sore throat.    Eyes: Negative for visual disturbance.   Respiratory: Positive for shortness of breath. Negative for cough.    Cardiovascular: Positive for chest pain.   Gastrointestinal: Negative for abdominal pain, blood in stool, diarrhea, nausea and vomiting.   Genitourinary: Negative for dysuria and hematuria.   Musculoskeletal: Negative for arthralgias.   Skin: Negative for rash.   Neurological: Negative for dizziness, light-headedness, numbness and headaches.   All other systems reviewed and are negative.       PAST MEDICAL HISTORY:  History reviewed. No pertinent past medical  history.    PAST SURGICAL HISTORY:  History reviewed. No pertinent surgical history.    CURRENT MEDICATIONS:    Current Facility-Administered Medications   Medication     ketorolac (TORADOL) injection 15 mg     No current outpatient medications on file.       ALLERGIES:  No Known Allergies    FAMILY HISTORY:  History reviewed. No pertinent family history.    SOCIAL HISTORY:   Social History     Socioeconomic History     Marital status: Single     Spouse name: Not on file     Number of children: Not on file     Years of education: Not on file     Highest education level: Not on file   Occupational History     Not on file   Tobacco Use     Smoking status: Not on file   Substance and Sexual Activity     Alcohol use: Not on file     Drug use: Not on file     Sexual activity: Not on file   Other Topics Concern     Not on file   Social History Narrative     Not on file     Social Determinants of Health     Financial Resource Strain:      Difficulty of Paying Living Expenses:    Food Insecurity:      Worried About Running Out of Food in the Last Year:      Ran Out of Food in the Last Year:    Transportation Needs:      Lack of Transportation (Medical):      Lack of Transportation (Non-Medical):    Physical Activity:      Days of Exercise per Week:      Minutes of Exercise per Session:    Stress:      Feeling of Stress :    Social Connections:      Frequency of Communication with Friends and Family:      Frequency of Social Gatherings with Friends and Family:      Attends Restoration Services:      Active Member of Clubs or Organizations:      Attends Club or Organization Meetings:      Marital Status:    Intimate Partner Violence:      Fear of Current or Ex-Partner:      Emotionally Abused:      Physically Abused:      Sexually Abused:        VITALS:  /70   Pulse 82   Temp 98.8  F (37.1  C) (Oral)   Resp 18   Wt 43.1 kg (95 lb)   SpO2 100%     PHYSICAL EXAM:  Physical Exam  Vitals and nursing note reviewed.    Constitutional:       Appearance: Normal appearance.   HENT:      Head: Normocephalic and atraumatic.      Right Ear: External ear normal.      Left Ear: External ear normal.      Nose: Nose normal.      Mouth/Throat:      Mouth: Mucous membranes are moist.   Eyes:      Extraocular Movements: Extraocular movements intact.      Conjunctiva/sclera: Conjunctivae normal.      Pupils: Pupils are equal, round, and reactive to light.   Cardiovascular:      Rate and Rhythm: Regular rhythm. Tachycardia present.      Comments: Heart rate between 80 and 110 when at rest.  Pulmonary:      Effort: Pulmonary effort is normal.      Breath sounds: Normal breath sounds. No wheezing or rales.   Abdominal:      General: Abdomen is flat. There is no distension.      Palpations: Abdomen is soft.      Tenderness: There is no abdominal tenderness. There is no guarding.   Musculoskeletal:         General: Normal range of motion.      Cervical back: Normal range of motion and neck supple.      Right lower leg: No edema.      Left lower leg: No edema.   Lymphadenopathy:      Cervical: No cervical adenopathy.   Skin:     General: Skin is warm and dry.   Neurological:      General: No focal deficit present.      Mental Status: She is alert and oriented to person, place, and time. Mental status is at baseline.      Comments: No gross focal neurologic deficits   Psychiatric:         Mood and Affect: Mood normal.         Behavior: Behavior normal.         Thought Content: Thought content normal.     LAB:  All pertinent labs reviewed and interpreted.  Results for orders placed or performed during the hospital encounter of 10/08/21   CT Chest Pulmonary Embolism w Contrast    Impression    IMPRESSION:  1.  No acute chest findings. No pulmonary artery embolus or thoracic aortic dissection.  2.  No pneumonic infiltrate or pleural effusion.  3.  Mild asymmetric dilatation of the left renal collecting system. Recommend follow-up with stone protocol CT  or ultrasound.  4.  Left lower lobe 5 mm pulmonary nodule. Recommend follow-up per guidelines below.        REFERENCE:  Guidelines for Management of Incidental Pulmonary Nodules Detected on CT Images: From the Fleischner Society 2017.   Guidelines apply to incidental nodules in patients who are 35 years or older.  Guidelines do not apply to lung cancer screening, patients with immunosuppression, or patients with known primary cancer.    SINGLE NODULE  Nodule size less than or equal to 6 mm  Low-risk patients: No follow-up needed.  High-risk patients: Optional follow-up at 12 months.       Basic metabolic panel   Result Value Ref Range    Sodium 142 136 - 145 mmol/L    Potassium 3.3 (L) 3.5 - 5.0 mmol/L    Chloride 104 98 - 107 mmol/L    Carbon Dioxide (CO2) 27 22 - 31 mmol/L    Anion Gap 11 5 - 18 mmol/L    Urea Nitrogen 13 8 - 22 mg/dL    Creatinine 0.86 0.60 - 1.10 mg/dL    Calcium 10.1 8.5 - 10.5 mg/dL    Glucose 99 70 - 125 mg/dL    GFR Estimate 77 >60 mL/min/1.73m2   Result Value Ref Range    Troponin I <0.01 0.00 - 0.29 ng/mL   Extra Red Top Tube   Result Value Ref Range    Hold Specimen JIC    CBC with platelets and differential   Result Value Ref Range    WBC Count 7.3 4.0 - 11.0 10e3/uL    RBC Count 4.21 3.80 - 5.20 10e6/uL    Hemoglobin 12.6 11.7 - 15.7 g/dL    Hematocrit 38.0 35.0 - 47.0 %    MCV 90 78 - 100 fL    MCH 29.9 26.5 - 33.0 pg    MCHC 33.2 31.5 - 36.5 g/dL    RDW 12.6 10.0 - 15.0 %    Platelet Count 198 150 - 450 10e3/uL    % Neutrophils 50 %    % Lymphocytes 37 %    % Monocytes 11 %    % Eosinophils 1 %    % Basophils 1 %    % Immature Granulocytes 0 %    NRBCs per 100 WBC 0 <1 /100    Absolute Neutrophils 3.7 1.6 - 8.3 10e3/uL    Absolute Lymphocytes 2.7 0.8 - 5.3 10e3/uL    Absolute Monocytes 0.8 0.0 - 1.3 10e3/uL    Absolute Eosinophils 0.1 0.0 - 0.7 10e3/uL    Absolute Basophils 0.1 0.0 - 0.2 10e3/uL    Absolute Immature Granulocytes 0.0 <=0.0 10e3/uL    Absolute NRBCs 0.0 10e3/uL   Extra  Blue Top Tube   Result Value Ref Range    Hold Specimen JIC    Result Value Ref Range    Magnesium 2.1 1.8 - 2.6 mg/dL   TSH with free T4 reflex   Result Value Ref Range    TSH 4.99 0.30 - 5.00 uIU/mL   ECG 12-LEAD WITH MUSE (L[facility])   Result Value Ref Range    Systolic Blood Pressure 178 mmHg    Diastolic Blood Pressure 94 mmHg    Ventricular Rate 104 BPM    Atrial Rate 104 BPM    CA Interval 192 ms    QRS Duration 72 ms     ms    QTc 476 ms    P Axis 85 degrees    R AXIS 56 degrees    T Axis 49 degrees    Interpretation ECG       Sinus tachycardia  Nonspecific ST and T wave abnormality  Abnormal ECG  No previous ECGs available  Confirmed by SEE ED PROVIDER NOTE FOR, ECG INTERPRETATION ([id number]),  RADHA KEMP ([id number]) on 10/8/2021 2:04:24 PM         RADIOLOGY:  Reviewed all pertinent imaging. Please see official radiology report.  CT Chest Pulmonary Embolism w Contrast   Final Result   IMPRESSION:   1.  No acute chest findings. No pulmonary artery embolus or thoracic aortic dissection.   2.  No pneumonic infiltrate or pleural effusion.   3.  Mild asymmetric dilatation of the left renal collecting system. Recommend follow-up with stone protocol CT or ultrasound.   4.  Left lower lobe 5 mm pulmonary nodule. Recommend follow-up per guidelines below.            REFERENCE:   Guidelines for Management of Incidental Pulmonary Nodules Detected on CT Images: From the Fleischner Society 2017.    Guidelines apply to incidental nodules in patients who are 35 years or older.   Guidelines do not apply to lung cancer screening, patients with immunosuppression, or patients with known primary cancer.      SINGLE NODULE   Nodule size less than or equal to 6 mm   Low-risk patients: No follow-up needed.   High-risk patients: Optional follow-up at 12 months.                EKG:    Performed at: 9:25 AM  Impression: Nonspecific ST changes, no acute ischemic changes  Rate: 104  Rhythm: Sinus  Axis: Normal  CA Interval: 192  QRS  Interval: 72  QTc Interval: 476  ST Changes: No acute ischemic changes, nonspecific ST and T wave changes  Comparison: No prior    I have independently reviewed and interpreted the EKG(s) documented above.    I, Sara Behmanesh , am serving as a scribe to document services personally performed by Dr. aRmirez based on my observation and the provider's statements to me. I, Jaydon Ramirez MD attest that Sara Behmanesh is acting in a scribe capacity, has observed my performance of the services and has documented them in accordance with my direction.    Jaydon Ramirez M.D.  Emergency Medicine  [facility] EMERGENCY ROOM  [address] [id number] [phone]  Dept: [phone]       Jaydon Ramirez MD  10/08/21 1451

## 2022-09-12 ENCOUNTER — HOSPITAL ENCOUNTER (EMERGENCY)
Facility: OTHER | Age: 57
Discharge: HOME | End: 2022-09-12
Attending: EMERGENCY MEDICINE
Payer: MEDICAID

## 2022-09-12 VITALS
HEIGHT: 60 IN | WEIGHT: 240 LBS | BODY MASS INDEX: 47.12 KG/M2 | HEART RATE: 95 BPM | SYSTOLIC BLOOD PRESSURE: 170 MMHG | DIASTOLIC BLOOD PRESSURE: 98 MMHG | TEMPERATURE: 98 F | OXYGEN SATURATION: 99 % | RESPIRATION RATE: 16 BRPM

## 2022-09-12 DIAGNOSIS — M79.662 PAIN OF LEFT CALF: Primary | ICD-10-CM

## 2022-09-12 LAB
ANION GAP SERPL CALC-SCNC: 7 MMOL/L (ref 8–16)
BUN SERPL-MCNC: 12 MG/DL (ref 6–20)
CALCIUM SERPL-MCNC: 9.3 MG/DL (ref 8.7–10.5)
CHLORIDE SERPL-SCNC: 108 MMOL/L (ref 95–110)
CO2 SERPL-SCNC: 26 MMOL/L (ref 23–29)
CREAT SERPL-MCNC: 0.7 MG/DL (ref 0.5–1.4)
EST. GFR  (NO RACE VARIABLE): >60 ML/MIN/1.73 M^2
GLUCOSE SERPL-MCNC: 90 MG/DL (ref 70–110)
POTASSIUM SERPL-SCNC: 3.6 MMOL/L (ref 3.5–5.1)
SODIUM SERPL-SCNC: 141 MMOL/L (ref 136–145)

## 2022-09-12 PROCEDURE — 63600175 PHARM REV CODE 636 W HCPCS: Performed by: EMERGENCY MEDICINE

## 2022-09-12 PROCEDURE — 99285 EMERGENCY DEPT VISIT HI MDM: CPT | Mod: 25

## 2022-09-12 PROCEDURE — 96372 THER/PROPH/DIAG INJ SC/IM: CPT | Performed by: EMERGENCY MEDICINE

## 2022-09-12 PROCEDURE — 80048 BASIC METABOLIC PNL TOTAL CA: CPT | Performed by: EMERGENCY MEDICINE

## 2022-09-12 RX ORDER — KETOROLAC TROMETHAMINE 30 MG/ML
10 INJECTION, SOLUTION INTRAMUSCULAR; INTRAVENOUS
Status: COMPLETED | OUTPATIENT
Start: 2022-09-12 | End: 2022-09-12

## 2022-09-12 RX ADMIN — KETOROLAC TROMETHAMINE 10 MG: 30 INJECTION, SOLUTION INTRAMUSCULAR; INTRAVENOUS at 03:09

## 2022-09-12 NOTE — ED TRIAGE NOTES
"Chief Complaint   Patient presents with    Leg Pain     Pt c/o L calf pain X 3 days, denies any injury. Increased pain my bending knee, pt states "I think it is arthritis"        Pt experiencing L calf pain x3 days. States "it was keeping me up at night, I think it's arthritis". AAOX4 and in no acute distress.   "

## 2022-09-12 NOTE — ED PROVIDER NOTES
"Encounter Date: 2022       History     Chief Complaint   Patient presents with    Leg Pain     Pt c/o L calf pain X 3 days, denies any injury. Increased pain my bending knee, pt states "I think it is arthritis"        56-year-old female presents with complaint of left calf pain.  She reports that she had "a serious charley horse" 3 days ago in the calf, and has had some recurrent cramping since.  She denies any known inciting event or injury.  She has been eating and drinking normally.  She has had no nausea or vomiting or diarrhea or fever.  There is no pain presently, but she is requesting "a cortisone shot in case it comes back. "    Review of patient's allergies indicates:  No Known Allergies  Past Medical History:   Diagnosis Date    Hypertension      Past Surgical History:   Procedure Laterality Date     SECTION       History reviewed. No pertinent family history.  Social History     Tobacco Use    Smoking status: Never    Smokeless tobacco: Never   Substance Use Topics    Alcohol use: No    Drug use: No     Review of Systems   Constitutional:  Negative for chills and fever.   HENT:  Negative for congestion and sore throat.    Eyes:  Negative for visual disturbance.   Respiratory:  Negative for cough and shortness of breath.    Cardiovascular:  Negative for chest pain, palpitations and leg swelling.   Gastrointestinal:  Negative for abdominal pain, diarrhea and vomiting.   Genitourinary:  Negative for decreased urine volume, dysuria and vaginal discharge.   Musculoskeletal:  Positive for myalgias (Intermittent, left calf). Negative for joint swelling, neck pain and neck stiffness.   Skin:  Negative for rash and wound.   Neurological:  Negative for weakness, numbness and headaches.   Psychiatric/Behavioral:  Negative for confusion.      Physical Exam     Initial Vitals [22 1358]   BP Pulse Resp Temp SpO2   (!) 174/103 99 18 98.6 °F (37 °C) 98 %      MAP       --         Physical " Exam    Nursing note and vitals reviewed.  Constitutional: She appears well-developed and well-nourished. No distress.   Obese.   HENT:   Head: Normocephalic and atraumatic.   Mouth/Throat: Oropharynx is clear and moist. No oropharyngeal exudate.   Eyes: Conjunctivae and EOM are normal. Pupils are equal, round, and reactive to light.   Neck: Neck supple.   Cardiovascular:  Normal rate and normal heart sounds.           No murmur heard.  Pulmonary/Chest: Breath sounds normal. No respiratory distress. She has no wheezes. She has no rhonchi. She has no rales.   Abdominal: Abdomen is soft. There is no abdominal tenderness. There is no rebound and no guarding.   Musculoskeletal:         General: No tenderness or edema.      Cervical back: Neck supple.      Comments: Bilateral calves are symmetric and without edema.  There is no tenderness.  There are chronic venous stasis changes present.     Neurological: She is alert and oriented to person, place, and time. She has normal strength. GCS score is 15. GCS eye subscore is 4. GCS verbal subscore is 5. GCS motor subscore is 6.   Skin: Skin is warm and dry. No rash noted.   Psychiatric: She has a normal mood and affect. Thought content normal.       ED Course   Procedures  Labs Reviewed   BASIC METABOLIC PANEL - Abnormal; Notable for the following components:       Result Value    Anion Gap 7 (*)     All other components within normal limits          Imaging Results              US Lower Extremity Veins Left (Final result)  Result time 09/12/22 15:48:17      Final result by Bettie Pedro MD (09/12/22 15:48:17)                   Impression:      No evidence of deep venous thrombosis in the left lower extremity.      Electronically signed by: Bettie Pedro  Date:    09/12/2022  Time:    15:48               Narrative:    EXAMINATION:  US LOWER EXTREMITY VEINS LEFT    CLINICAL HISTORY:  calf pain;    TECHNIQUE:  Duplex and color flow Doppler evaluation and graded  compression of the left lower extremity veins was performed.    COMPARISON:  None    FINDINGS:  Left thigh veins: The common femoral, femoral, popliteal, upper greater saphenous, and deep femoral veins are patent and free of thrombus. The veins are normally compressible and have normal phasic flow and augmentation response.    Left calf veins: The visualized calf veins are patent.    Contralateral CFV: The contralateral (right) common femoral vein is patent and free of thrombus.    Miscellaneous: None                                       Medications   ketorolac injection 9.999 mg (9.999 mg Intramuscular Given 9/12/22 1543)     Medical Decision Making:   ED Management:  Urgent evaluation of a 56-year-old female presents with complaint of intermittent left calf pain x3 days.  Vital signs reveal hypertension, afebrile.  Patient has history of hypertension is not currently symptomatic from it.  On exam there is no tenderness or swelling of the calf.  There is no evidence of cellulitis.  Ultrasound shows no DVT.  Metabolic profile shows no major electrolyte disturbance, important to evaluate given a diuretic and ARB use.  For the patient is treated here with Toradol injection, encouraged gentle stretches, Tylenol, and close follow-up with ANASTACIA GANDARA. She is discharged in good condition.            ED Course as of 09/12/22 1701   Mon Sep 12, 2022   1646 I called the lab to check on BMP, in process over 1 hour.  They state 5 minutes longer. [AK]      ED Course User Index  [AK] Faiza Arredondo MD                 Clinical Impression:   Final diagnoses:  [M79.662] Pain of left calf (Primary)      ED Disposition Condition    Discharge Stable          ED Prescriptions    None       Follow-up Information       Follow up With Specialties Details Why Contact Info    [facility] [place] [facility] Schedule an appointment as soon as possible for a visit  As needed [address] [facility] [id number] [phone]       [facility] Emergency Dept Emergency Medicine  As needed, If symptoms worsen [address]  [facility] [id number] [phone]             Faiza Arredondo MD  09/12/22 7684